# Patient Record
Sex: FEMALE | Race: WHITE
[De-identification: names, ages, dates, MRNs, and addresses within clinical notes are randomized per-mention and may not be internally consistent; named-entity substitution may affect disease eponyms.]

---

## 2017-05-18 ENCOUNTER — APPOINTMENT (OUTPATIENT)
Dept: OPHTHALMOLOGY | Facility: CLINIC | Age: 62
End: 2017-05-18

## 2017-05-18 DIAGNOSIS — H26.9 UNSPECIFIED CATARACT: ICD-10-CM

## 2019-06-03 ENCOUNTER — TRANSCRIPTION ENCOUNTER (OUTPATIENT)
Age: 64
End: 2019-06-03

## 2023-03-24 ENCOUNTER — APPOINTMENT (OUTPATIENT)
Dept: INTERNAL MEDICINE | Facility: CLINIC | Age: 68
End: 2023-03-24
Payer: MEDICARE

## 2023-03-24 VITALS
HEIGHT: 64 IN | DIASTOLIC BLOOD PRESSURE: 74 MMHG | BODY MASS INDEX: 26.8 KG/M2 | HEART RATE: 60 BPM | OXYGEN SATURATION: 96 % | WEIGHT: 157 LBS | TEMPERATURE: 99 F | SYSTOLIC BLOOD PRESSURE: 116 MMHG

## 2023-03-24 DIAGNOSIS — I25.10 ATHEROSCLEROTIC HEART DISEASE OF NATIVE CORONARY ARTERY W/OUT ANGINA PECTORIS: ICD-10-CM

## 2023-03-24 DIAGNOSIS — E78.5 HYPERLIPIDEMIA, UNSPECIFIED: ICD-10-CM

## 2023-03-24 DIAGNOSIS — Z95.5 PRESENCE OF CORONARY ANGIOPLASTY IMPLANT AND GRAFT: ICD-10-CM

## 2023-03-24 DIAGNOSIS — D64.9 ANEMIA, UNSPECIFIED: ICD-10-CM

## 2023-03-24 DIAGNOSIS — Z80.0 FAMILY HISTORY OF MALIGNANT NEOPLASM OF DIGESTIVE ORGANS: ICD-10-CM

## 2023-03-24 PROCEDURE — 99214 OFFICE O/P EST MOD 30 MIN: CPT

## 2023-03-24 RX ORDER — IRON/IRON ASP GLY/FA/MV-MIN 38 125-25-1MG
TABLET ORAL
Refills: 0 | Status: ACTIVE | COMMUNITY

## 2023-03-24 RX ORDER — VITAMIN E ACID SUCCINATE 268 MG
TABLET ORAL
Refills: 0 | Status: ACTIVE | COMMUNITY

## 2023-03-24 RX ORDER — ROSUVASTATIN CALCIUM 40 MG/1
40 TABLET, FILM COATED ORAL DAILY
Refills: 0 | Status: ACTIVE | COMMUNITY

## 2023-03-24 RX ORDER — MULTIVITAMIN
TABLET ORAL DAILY
Refills: 0 | Status: ACTIVE | COMMUNITY

## 2023-03-24 RX ORDER — ASPIRIN 81 MG
81 TABLET, DELAYED RELEASE (ENTERIC COATED) ORAL DAILY
Refills: 0 | Status: ACTIVE | COMMUNITY

## 2023-03-24 RX ORDER — METOPROLOL SUCCINATE 25 MG/1
25 TABLET, EXTENDED RELEASE ORAL DAILY
Refills: 0 | Status: ACTIVE | COMMUNITY

## 2023-03-24 RX ORDER — VITAMIN B COMPLEX
TABLET ORAL
Refills: 0 | Status: ACTIVE | COMMUNITY

## 2023-03-24 NOTE — HEALTH RISK ASSESSMENT
[No] : In the past 12 months have you used drugs other than those required for medical reasons? No [0] : 2) Feeling down, depressed, or hopeless: Not at all (0) [Patient reported mammogram was normal] : Patient reported mammogram was normal [Patient reported PAP Smear was normal] : Patient reported PAP Smear was normal [Patient reported colonoscopy was normal] : Patient reported colonoscopy was normal [Never] : Never [Patient reported bone density results were normal] : Patient reported bone density results were normal [[id number]] : 0 [MammogramDate] : 12/2022 [MammogramComments] : JARED [PapSmearDate] : 12/2022 [PapSmearComments] : Dr.Camilla Dumont, Gyn [BoneDensityDate] : 12/2022 [BoneDensityComments] : Dr.Camilla Dumont, Gyn [ColonoscopyDate] : 01/2021 [ColonoscopyComments] : [facility], repeat in 2025.

## 2023-03-24 NOTE — HISTORY OF PRESENT ILLNESS
[FreeTextEntry1] : Patient comes in to establish care.\par  [de-identified] : BEN DOHERTY is a 67 year F who comes in to establish care.\par Patient states she was following with [facility]  and saw the NP last year for her annual exam.\par She notes a history of CAD status post stent 4 years ago and follows with cardiology , hyperlipoidemia and anemia.\par She has not had blood work done since last year and would like to have her blood work taken today.\par Patient denies any cp, sob,abdominal pain, nausea, vomiting, palpitations, fever, chills, constipation, diarrhea.\par

## 2023-03-24 NOTE — ASSESSMENT
[FreeTextEntry1] : 1.health maintenance: Continue follow-up with [facility], [place] for mammograms, GYN for Pap smear and bone densities.  She will follow-up with GI for repeat colonoscopy in 2025.\par Discussed blood work to obtain.\par Patient counseled regarding recommendations for vaccines, diet and exercise and all preventative screening.\par \par 2.CAD status post stent: Obtain records from cardiology and continue follow-up.  Continue on statin and aspirin.\par \par 3.history of anemia: She continues on ferrous sulfate, notes it is due to heavy menses but now post menopause, recheck CBC.\par \par 4.hyperlipidemia: Continue on Crestor 40 mg and recheck lipids. Patient advised on low cholesterol diet-decrease in white carbs and exercise 150 minutes per week.\par

## 2023-03-27 LAB
ALBUMIN SERPL ELPH-MCNC: 4.2 G/DL
ALP BLD-CCNC: 81 U/L
ALT SERPL-CCNC: 23 U/L
ANION GAP SERPL CALC-SCNC: 11 MMOL/L
AST SERPL-CCNC: 20 U/L
BASOPHILS # BLD AUTO: 0.04 K/UL
BASOPHILS NFR BLD AUTO: 0.8 %
BILIRUB SERPL-MCNC: 0.4 MG/DL
BUN SERPL-MCNC: 18 MG/DL
CALCIUM SERPL-MCNC: 9.6 MG/DL
CHLORIDE SERPL-SCNC: 107 MMOL/L
CHOLEST SERPL-MCNC: 164 MG/DL
CO2 SERPL-SCNC: 26 MMOL/L
CREAT SERPL-MCNC: 0.73 MG/DL
EGFR: 91 ML/MIN/1.73M2
EOSINOPHIL # BLD AUTO: 0.04 K/UL
EOSINOPHIL NFR BLD AUTO: 0.8 %
ESTIMATED AVERAGE GLUCOSE: 120 MG/DL
GLUCOSE SERPL-MCNC: 103 MG/DL
HBA1C MFR BLD HPLC: 5.8 %
HCT VFR BLD CALC: 41.8 %
HDLC SERPL-MCNC: 80 MG/DL
HGB BLD-MCNC: 13.1 G/DL
IMM GRANULOCYTES NFR BLD AUTO: 0.2 %
LDLC SERPL CALC-MCNC: 73 MG/DL
LYMPHOCYTES # BLD AUTO: 1.28 K/UL
LYMPHOCYTES NFR BLD AUTO: 26.3 %
MAN DIFF?: NORMAL
MCHC RBC-ENTMCNC: 29.6 PG
MCHC RBC-ENTMCNC: 31.3 GM/DL
MCV RBC AUTO: 94.4 FL
MONOCYTES # BLD AUTO: 0.33 K/UL
MONOCYTES NFR BLD AUTO: 6.8 %
NEUTROPHILS # BLD AUTO: 3.16 K/UL
NEUTROPHILS NFR BLD AUTO: 65.1 %
NONHDLC SERPL-MCNC: 84 MG/DL
PLATELET # BLD AUTO: 240 K/UL
POTASSIUM SERPL-SCNC: 4.8 MMOL/L
PROT SERPL-MCNC: 6.7 G/DL
RBC # BLD: 4.43 M/UL
RBC # FLD: 13.5 %
SODIUM SERPL-SCNC: 143 MMOL/L
TRIGL SERPL-MCNC: 56 MG/DL
TSH SERPL-ACNC: 1.37 UIU/ML
WBC # FLD AUTO: 4.86 K/UL

## 2023-03-29 ENCOUNTER — NON-APPOINTMENT (OUTPATIENT)
Age: 68
End: 2023-03-29

## 2023-03-29 DIAGNOSIS — R73.03 PREDIABETES.: ICD-10-CM

## 2023-07-28 ENCOUNTER — APPOINTMENT (OUTPATIENT)
Dept: INTERNAL MEDICINE | Facility: CLINIC | Age: 68
End: 2023-07-28

## 2023-08-26 ENCOUNTER — INPATIENT (INPATIENT)
Facility: HOSPITAL | Age: 68
LOS: 5 days | Discharge: HOME | DRG: 867 | End: 2023-09-01
Attending: HOSPITALIST | Admitting: STUDENT IN AN ORGANIZED HEALTH CARE EDUCATION/TRAINING PROGRAM
Payer: MEDICARE

## 2023-08-26 VITALS
RESPIRATION RATE: 18 BRPM | HEART RATE: 78 BPM | OXYGEN SATURATION: 95 % | TEMPERATURE: 103 F | DIASTOLIC BLOOD PRESSURE: 58 MMHG | SYSTOLIC BLOOD PRESSURE: 140 MMHG

## 2023-08-26 DIAGNOSIS — R50.9 FEVER, UNSPECIFIED: ICD-10-CM

## 2023-08-26 LAB
ADD ON TEST-SPECIMEN IN LAB: SIGNIFICANT CHANGE UP
ALBUMIN SERPL ELPH-MCNC: 2.9 G/DL — LOW (ref 3.3–5)
ALP SERPL-CCNC: 72 U/L — SIGNIFICANT CHANGE UP (ref 40–120)
ALT FLD-CCNC: 43 U/L — SIGNIFICANT CHANGE UP (ref 12–78)
ANION GAP SERPL CALC-SCNC: 7 MMOL/L — SIGNIFICANT CHANGE UP (ref 5–17)
APPEARANCE UR: CLEAR — SIGNIFICANT CHANGE UP
APTT BLD: 31.1 SEC — SIGNIFICANT CHANGE UP (ref 24.5–35.6)
AST SERPL-CCNC: 41 U/L — HIGH (ref 15–37)
BACTERIA # UR AUTO: NEGATIVE — SIGNIFICANT CHANGE UP
BASOPHILS # BLD AUTO: 0.02 K/UL — SIGNIFICANT CHANGE UP (ref 0–0.2)
BASOPHILS NFR BLD AUTO: 0.5 % — SIGNIFICANT CHANGE UP (ref 0–2)
BILIRUB SERPL-MCNC: 1.5 MG/DL — HIGH (ref 0.2–1.2)
BILIRUB UR-MCNC: NEGATIVE — SIGNIFICANT CHANGE UP
BUN SERPL-MCNC: 14 MG/DL — SIGNIFICANT CHANGE UP (ref 7–23)
CALCIUM SERPL-MCNC: 8.3 MG/DL — LOW (ref 8.5–10.1)
CHLORIDE SERPL-SCNC: 100 MMOL/L — SIGNIFICANT CHANGE UP (ref 96–108)
CO2 SERPL-SCNC: 23 MMOL/L — SIGNIFICANT CHANGE UP (ref 22–31)
COLOR SPEC: YELLOW — SIGNIFICANT CHANGE UP
CREAT SERPL-MCNC: 0.82 MG/DL — SIGNIFICANT CHANGE UP (ref 0.5–1.3)
DIFF PNL FLD: ABNORMAL
EGFR: 79 ML/MIN/1.73M2 — SIGNIFICANT CHANGE UP
EOSINOPHIL # BLD AUTO: 0.02 K/UL — SIGNIFICANT CHANGE UP (ref 0–0.5)
EOSINOPHIL NFR BLD AUTO: 0.5 % — SIGNIFICANT CHANGE UP (ref 0–6)
EPI CELLS # UR: NEGATIVE — SIGNIFICANT CHANGE UP
GLUCOSE SERPL-MCNC: 119 MG/DL — HIGH (ref 70–99)
GLUCOSE UR QL: NEGATIVE — SIGNIFICANT CHANGE UP
HCT VFR BLD CALC: 32.7 % — LOW (ref 34.5–45)
HGB BLD-MCNC: 11.1 G/DL — LOW (ref 11.5–15.5)
IMM GRANULOCYTES NFR BLD AUTO: 0.5 % — SIGNIFICANT CHANGE UP (ref 0–0.9)
INR BLD: 1.19 RATIO — HIGH (ref 0.85–1.18)
KETONES UR-MCNC: NEGATIVE — SIGNIFICANT CHANGE UP
LACTATE SERPL-SCNC: 0.6 MMOL/L — LOW (ref 0.7–2)
LEUKOCYTE ESTERASE UR-ACNC: NEGATIVE — SIGNIFICANT CHANGE UP
LG PLATELETS BLD QL AUTO: SLIGHT — SIGNIFICANT CHANGE UP
LYMPHOCYTES # BLD AUTO: 1.05 K/UL — SIGNIFICANT CHANGE UP (ref 1–3.3)
LYMPHOCYTES # BLD AUTO: 25.4 % — SIGNIFICANT CHANGE UP (ref 13–44)
MANUAL SMEAR VERIFICATION: SIGNIFICANT CHANGE UP
MCHC RBC-ENTMCNC: 30 PG — SIGNIFICANT CHANGE UP (ref 27–34)
MCHC RBC-ENTMCNC: 33.9 GM/DL — SIGNIFICANT CHANGE UP (ref 32–36)
MCV RBC AUTO: 88.4 FL — SIGNIFICANT CHANGE UP (ref 80–100)
MONOCYTES # BLD AUTO: 0.53 K/UL — SIGNIFICANT CHANGE UP (ref 0–0.9)
MONOCYTES NFR BLD AUTO: 12.8 % — SIGNIFICANT CHANGE UP (ref 2–14)
NEUTROPHILS # BLD AUTO: 2.5 K/UL — SIGNIFICANT CHANGE UP (ref 1.8–7.4)
NEUTROPHILS NFR BLD AUTO: 60.3 % — SIGNIFICANT CHANGE UP (ref 43–77)
NITRITE UR-MCNC: NEGATIVE — SIGNIFICANT CHANGE UP
NT-PROBNP SERPL-SCNC: 752 PG/ML — HIGH (ref 0–125)
PARASITE BLD: SIGNIFICANT CHANGE UP
PH UR: 6.5 — SIGNIFICANT CHANGE UP (ref 5–8)
PLAT MORPH BLD: ABNORMAL
PLATELET # BLD AUTO: 59 K/UL — LOW (ref 150–400)
POTASSIUM SERPL-MCNC: 4.1 MMOL/L — SIGNIFICANT CHANGE UP (ref 3.5–5.3)
POTASSIUM SERPL-SCNC: 4.1 MMOL/L — SIGNIFICANT CHANGE UP (ref 3.5–5.3)
PROT SERPL-MCNC: 7 GM/DL — SIGNIFICANT CHANGE UP (ref 6–8.3)
PROT UR-MCNC: NEGATIVE — SIGNIFICANT CHANGE UP
PROTHROM AB SERPL-ACNC: 13.4 SEC — HIGH (ref 9.5–13)
RAPID RVP RESULT: SIGNIFICANT CHANGE UP
RBC # BLD: 3.7 M/UL — LOW (ref 3.8–5.2)
RBC # FLD: 14.7 % — HIGH (ref 10.3–14.5)
RBC BLD AUTO: SIGNIFICANT CHANGE UP
RBC CASTS # UR COMP ASSIST: SIGNIFICANT CHANGE UP /HPF (ref 0–4)
SARS-COV-2 RNA SPEC QL NAA+PROBE: SIGNIFICANT CHANGE UP
SODIUM SERPL-SCNC: 130 MMOL/L — LOW (ref 135–145)
SP GR SPEC: 1.01 — SIGNIFICANT CHANGE UP (ref 1.01–1.02)
TROPONIN I, HIGH SENSITIVITY RESULT: 6.73 NG/L — SIGNIFICANT CHANGE UP
UROBILINOGEN FLD QL: NEGATIVE — SIGNIFICANT CHANGE UP
WBC # BLD: 4.14 K/UL — SIGNIFICANT CHANGE UP (ref 3.8–10.5)
WBC # FLD AUTO: 4.14 K/UL — SIGNIFICANT CHANGE UP (ref 3.8–10.5)
WBC UR QL: NEGATIVE /HPF — SIGNIFICANT CHANGE UP (ref 0–5)

## 2023-08-26 PROCEDURE — 85027 COMPLETE CBC AUTOMATED: CPT

## 2023-08-26 PROCEDURE — 93010 ELECTROCARDIOGRAM REPORT: CPT

## 2023-08-26 PROCEDURE — 93306 TTE W/DOPPLER COMPLETE: CPT

## 2023-08-26 PROCEDURE — 83550 IRON BINDING TEST: CPT

## 2023-08-26 PROCEDURE — 85045 AUTOMATED RETICULOCYTE COUNT: CPT

## 2023-08-26 PROCEDURE — 86618 LYME DISEASE ANTIBODY: CPT

## 2023-08-26 PROCEDURE — 71250 CT THORAX DX C-: CPT | Mod: MA

## 2023-08-26 PROCEDURE — 71045 X-RAY EXAM CHEST 1 VIEW: CPT | Mod: 26

## 2023-08-26 PROCEDURE — 97116 GAIT TRAINING THERAPY: CPT | Mod: GP

## 2023-08-26 PROCEDURE — 82607 VITAMIN B-12: CPT

## 2023-08-26 PROCEDURE — 87468 ANAPLSMA PHGCYTOPHLM AMP PRB: CPT

## 2023-08-26 PROCEDURE — 99285 EMERGENCY DEPT VISIT HI MDM: CPT

## 2023-08-26 PROCEDURE — 80053 COMPREHEN METABOLIC PANEL: CPT

## 2023-08-26 PROCEDURE — 83010 ASSAY OF HAPTOGLOBIN QUANT: CPT

## 2023-08-26 PROCEDURE — 82746 ASSAY OF FOLIC ACID SERUM: CPT

## 2023-08-26 PROCEDURE — 97161 PT EVAL LOW COMPLEX 20 MIN: CPT | Mod: GP

## 2023-08-26 PROCEDURE — 83615 LACTATE (LD) (LDH) ENZYME: CPT

## 2023-08-26 PROCEDURE — 83540 ASSAY OF IRON: CPT

## 2023-08-26 PROCEDURE — 85049 AUTOMATED PLATELET COUNT: CPT

## 2023-08-26 PROCEDURE — 87207 SMEAR SPECIAL STAIN: CPT

## 2023-08-26 PROCEDURE — 86617 LYME DISEASE ANTIBODY: CPT

## 2023-08-26 PROCEDURE — 71250 CT THORAX DX C-: CPT | Mod: 26

## 2023-08-26 PROCEDURE — 80048 BASIC METABOLIC PNL TOTAL CA: CPT

## 2023-08-26 PROCEDURE — 82728 ASSAY OF FERRITIN: CPT

## 2023-08-26 PROCEDURE — 87484 EHRLICHA CHAFFEENSIS AMP PRB: CPT

## 2023-08-26 PROCEDURE — 86803 HEPATITIS C AB TEST: CPT

## 2023-08-26 PROCEDURE — 87798 DETECT AGENT NOS DNA AMP: CPT

## 2023-08-26 PROCEDURE — 36415 COLL VENOUS BLD VENIPUNCTURE: CPT

## 2023-08-26 RX ORDER — ACETAMINOPHEN 500 MG
650 TABLET ORAL EVERY 6 HOURS
Refills: 0 | Status: DISCONTINUED | OUTPATIENT
Start: 2023-08-26 | End: 2023-09-01

## 2023-08-26 RX ORDER — ATOVAQUONE 750 MG/5ML
750 SUSPENSION ORAL ONCE
Refills: 0 | Status: COMPLETED | OUTPATIENT
Start: 2023-08-26 | End: 2023-08-26

## 2023-08-26 RX ORDER — AZITHROMYCIN 500 MG/1
500 TABLET, FILM COATED ORAL ONCE
Refills: 0 | Status: COMPLETED | OUTPATIENT
Start: 2023-08-26 | End: 2023-08-26

## 2023-08-26 RX ORDER — ACETAMINOPHEN 500 MG
650 TABLET ORAL ONCE
Refills: 0 | Status: COMPLETED | OUTPATIENT
Start: 2023-08-26 | End: 2023-08-26

## 2023-08-26 RX ORDER — SODIUM CHLORIDE 9 MG/ML
2000 INJECTION INTRAMUSCULAR; INTRAVENOUS; SUBCUTANEOUS ONCE
Refills: 0 | Status: COMPLETED | OUTPATIENT
Start: 2023-08-26 | End: 2023-08-26

## 2023-08-26 RX ADMIN — SODIUM CHLORIDE 2000 MILLILITER(S): 9 INJECTION INTRAMUSCULAR; INTRAVENOUS; SUBCUTANEOUS at 19:50

## 2023-08-26 RX ADMIN — SODIUM CHLORIDE 2000 MILLILITER(S): 9 INJECTION INTRAMUSCULAR; INTRAVENOUS; SUBCUTANEOUS at 22:04

## 2023-08-26 RX ADMIN — AZITHROMYCIN 255 MILLIGRAM(S): 500 TABLET, FILM COATED ORAL at 22:39

## 2023-08-26 RX ADMIN — ATOVAQUONE 750 MILLIGRAM(S): 750 SUSPENSION ORAL at 22:38

## 2023-08-26 NOTE — ED ADULT NURSE NOTE - NSFALLUNIVINTERV_ED_ALL_ED
Bed/Stretcher in lowest position, wheels locked, appropriate side rails in place/Call bell, personal items and telephone in reach/Instruct patient to call for assistance before getting out of bed/chair/stretcher/Non-slip footwear applied when patient is off stretcher/[place] to call system/Physically safe environment - no spills, clutter or unnecessary equipment/Purposeful proactive rounding/Room/bathroom lighting operational, light cord in reach

## 2023-08-26 NOTE — ED PROVIDER NOTE - PROGRESS NOTE DETAILS
Walter Durand for Dr. Silverman: Patient positive for babesia. Will start treatment and admit to medicine. Will also add on tickborne panel to look for co-infection. Walter Durand for Dr. Silverman: Patient highly suspected to have babesia as per cbc. . Will start treatment and admit to medicine. Will also add on tickborne panel to look for co-infection.

## 2023-08-26 NOTE — ED PROVIDER NOTE - ENMT, MLM
Airway patent, Nasal mucosa clear. Dry oral mucus membranes. Throat has no vesicles, no oropharyngeal exudates and uvula is midline.

## 2023-08-26 NOTE — ED PROVIDER NOTE - OBJECTIVE STATEMENT
68 y/o female with PMHx of CAD s/p stents presents to the ED c/o fever (Tmax 105), weakness, nausea, and episode of syncope today while standing from bed. Patient states she stood, lost consciousness, then fell backward onto the bed. Last took 500mg Tylenol PTA. Denies head strike. Denies vomiting, diarrhea, rhinorrhea, sore throat, urinary symptoms, chest pain. Nonsmoker, nondrinker.

## 2023-08-26 NOTE — ED ADULT TRIAGE NOTE - CHIEF COMPLAINT QUOTE
pt presents to ed from home for evaluation of fevers (tmax 105) since yesterday with associated nausea, vomiting and poor po intake, pt seen at urgent care with  negative work up. pt denies chest pain or sob. pt has hx of stents

## 2023-08-26 NOTE — ED PROVIDER NOTE - CLINICAL SUMMARY MEDICAL DECISION MAKING FREE TEXT BOX
Adult female coming in with fever, on day 2, syncopal episode with a fall but onto her bed, feeling generally weak. Vitals here febrile but otherwise normal. Exam no signs of bacterail infection, no signs of meningitis. CXR pending, 12 leads pending, cardiac enzymes pending, viral panel pending, UA pending. Tylenol given. If all results negative, will discharge.

## 2023-08-26 NOTE — ED ADULT NURSE NOTE - OBJECTIVE STATEMENT
pt presents to the ED c/o fever for multiple days with a tmax of 105. pt reports using around the clock tylenol and motrin to treat the fever - reportedly took it right before coming to . pt reports associated chills and nausea. denies any sick contacts. denies SOB or chest pain. pt is A&Ox4 and ambulatory at baseline. no further complaints at this time.

## 2023-08-27 DIAGNOSIS — R50.9 FEVER, UNSPECIFIED: ICD-10-CM

## 2023-08-27 DIAGNOSIS — R55 SYNCOPE AND COLLAPSE: ICD-10-CM

## 2023-08-27 DIAGNOSIS — E87.1 HYPO-OSMOLALITY AND HYPONATREMIA: ICD-10-CM

## 2023-08-27 LAB
ANION GAP SERPL CALC-SCNC: 2 MMOL/L — LOW (ref 5–17)
BABESIA MICROTI PCR, BLD RESULT: DETECTED
BABESIA MICROTI PCR, BLD RESULT: DETECTED
BUN SERPL-MCNC: 8 MG/DL — SIGNIFICANT CHANGE UP (ref 7–23)
CALCIUM SERPL-MCNC: 8.6 MG/DL — SIGNIFICANT CHANGE UP (ref 8.5–10.1)
CHLORIDE SERPL-SCNC: 110 MMOL/L — HIGH (ref 96–108)
CO2 SERPL-SCNC: 27 MMOL/L — SIGNIFICANT CHANGE UP (ref 22–31)
CREAT SERPL-MCNC: 0.72 MG/DL — SIGNIFICANT CHANGE UP (ref 0.5–1.3)
CULTURE RESULTS: SIGNIFICANT CHANGE UP
EGFR: 92 ML/MIN/1.73M2 — SIGNIFICANT CHANGE UP
FERRITIN SERPL-MCNC: 721 NG/ML — HIGH (ref 13–330)
FOLATE SERPL-MCNC: >20 NG/ML — SIGNIFICANT CHANGE UP
GLUCOSE SERPL-MCNC: 127 MG/DL — HIGH (ref 70–99)
HAPTOGLOB SERPL-MCNC: <20 MG/DL — LOW (ref 34–200)
HCT VFR BLD CALC: 31 % — LOW (ref 34.5–45)
HCV AB S/CO SERPL IA: 0.2 S/CO — SIGNIFICANT CHANGE UP (ref 0–0.99)
HCV AB SERPL-IMP: SIGNIFICANT CHANGE UP
HGB BLD-MCNC: 10.6 G/DL — LOW (ref 11.5–15.5)
IRON SATN MFR SERPL: 10 % — LOW (ref 14–50)
IRON SATN MFR SERPL: 21 UG/DL — LOW (ref 30–160)
LDH SERPL L TO P-CCNC: 480 U/L — HIGH (ref 84–241)
MCHC RBC-ENTMCNC: 30.3 PG — SIGNIFICANT CHANGE UP (ref 27–34)
MCHC RBC-ENTMCNC: 34.2 GM/DL — SIGNIFICANT CHANGE UP (ref 32–36)
MCV RBC AUTO: 88.6 FL — SIGNIFICANT CHANGE UP (ref 80–100)
PLATELET # BLD AUTO: 47 K/UL — LOW (ref 150–400)
POTASSIUM SERPL-MCNC: 4.3 MMOL/L — SIGNIFICANT CHANGE UP (ref 3.5–5.3)
POTASSIUM SERPL-SCNC: 4.3 MMOL/L — SIGNIFICANT CHANGE UP (ref 3.5–5.3)
RBC # BLD: 3.5 M/UL — LOW (ref 3.8–5.2)
RBC # BLD: 3.5 M/UL — LOW (ref 3.8–5.2)
RBC # FLD: 15 % — HIGH (ref 10.3–14.5)
RETICS #: 101.9 K/UL — SIGNIFICANT CHANGE UP (ref 25–125)
RETICS/RBC NFR: 2.9 % — HIGH (ref 0.5–2.5)
SODIUM SERPL-SCNC: 139 MMOL/L — SIGNIFICANT CHANGE UP (ref 135–145)
SPECIMEN SOURCE: SIGNIFICANT CHANGE UP
TIBC SERPL-MCNC: 207 UG/DL — LOW (ref 220–430)
UIBC SERPL-MCNC: 186 UG/DL — SIGNIFICANT CHANGE UP (ref 110–370)
VIT B12 SERPL-MCNC: 708 PG/ML — SIGNIFICANT CHANGE UP (ref 232–1245)
WBC # BLD: 3.88 K/UL — SIGNIFICANT CHANGE UP (ref 3.8–10.5)
WBC # FLD AUTO: 3.88 K/UL — SIGNIFICANT CHANGE UP (ref 3.8–10.5)

## 2023-08-27 PROCEDURE — 99233 SBSQ HOSP IP/OBS HIGH 50: CPT

## 2023-08-27 RX ORDER — METOPROLOL TARTRATE 50 MG
1 TABLET ORAL
Refills: 0 | DISCHARGE

## 2023-08-27 RX ORDER — ASPIRIN/CALCIUM CARB/MAGNESIUM 324 MG
81 TABLET ORAL DAILY
Refills: 0 | Status: DISCONTINUED | OUTPATIENT
Start: 2023-08-27 | End: 2023-09-01

## 2023-08-27 RX ORDER — SODIUM CHLORIDE 9 MG/ML
500 INJECTION INTRAMUSCULAR; INTRAVENOUS; SUBCUTANEOUS ONCE
Refills: 0 | Status: COMPLETED | OUTPATIENT
Start: 2023-08-27 | End: 2023-08-27

## 2023-08-27 RX ORDER — METOPROLOL TARTRATE 50 MG
25 TABLET ORAL EVERY 12 HOURS
Refills: 0 | Status: DISCONTINUED | OUTPATIENT
Start: 2023-08-27 | End: 2023-09-01

## 2023-08-27 RX ORDER — AZITHROMYCIN 500 MG/1
500 TABLET, FILM COATED ORAL DAILY
Refills: 0 | Status: DISCONTINUED | OUTPATIENT
Start: 2023-08-27 | End: 2023-08-27

## 2023-08-27 RX ORDER — ASPIRIN/CALCIUM CARB/MAGNESIUM 324 MG
1 TABLET ORAL
Refills: 0 | DISCHARGE

## 2023-08-27 RX ORDER — ROSUVASTATIN CALCIUM 5 MG/1
1 TABLET ORAL
Refills: 0 | DISCHARGE

## 2023-08-27 RX ORDER — ATOVAQUONE 750 MG/5ML
750 SUSPENSION ORAL EVERY 12 HOURS
Refills: 0 | Status: DISCONTINUED | OUTPATIENT
Start: 2023-08-27 | End: 2023-09-01

## 2023-08-27 RX ORDER — AZITHROMYCIN 500 MG/1
500 TABLET, FILM COATED ORAL EVERY 24 HOURS
Refills: 0 | Status: DISCONTINUED | OUTPATIENT
Start: 2023-08-27 | End: 2023-08-31

## 2023-08-27 RX ORDER — ATORVASTATIN CALCIUM 80 MG/1
40 TABLET, FILM COATED ORAL AT BEDTIME
Refills: 0 | Status: DISCONTINUED | OUTPATIENT
Start: 2023-08-27 | End: 2023-09-01

## 2023-08-27 RX ADMIN — ATOVAQUONE 750 MILLIGRAM(S): 750 SUSPENSION ORAL at 10:58

## 2023-08-27 RX ADMIN — Medication 650 MILLIGRAM(S): at 07:37

## 2023-08-27 RX ADMIN — Medication 650 MILLIGRAM(S): at 05:02

## 2023-08-27 RX ADMIN — ATORVASTATIN CALCIUM 40 MILLIGRAM(S): 80 TABLET, FILM COATED ORAL at 21:42

## 2023-08-27 RX ADMIN — Medication 650 MILLIGRAM(S): at 01:37

## 2023-08-27 RX ADMIN — SODIUM CHLORIDE 500 MILLILITER(S): 9 INJECTION INTRAMUSCULAR; INTRAVENOUS; SUBCUTANEOUS at 17:31

## 2023-08-27 RX ADMIN — ATOVAQUONE 750 MILLIGRAM(S): 750 SUSPENSION ORAL at 21:42

## 2023-08-27 RX ADMIN — AZITHROMYCIN 255 MILLIGRAM(S): 500 TABLET, FILM COATED ORAL at 10:58

## 2023-08-27 RX ADMIN — Medication 81 MILLIGRAM(S): at 11:02

## 2023-08-27 RX ADMIN — Medication 650 MILLIGRAM(S): at 14:44

## 2023-08-27 RX ADMIN — Medication 25 MILLIGRAM(S): at 06:28

## 2023-08-27 RX ADMIN — Medication 25 MILLIGRAM(S): at 17:30

## 2023-08-27 NOTE — H&P ADULT - PROBLEM SELECTOR PLAN 1
# Febrile syndrome   - As per ED course: highly suspicious for babesia  IV Zithromax /  Mepron  - panculture  - rapid RVP - not detected  - ID following

## 2023-08-27 NOTE — H&P ADULT - HISTORY OF PRESENT ILLNESS
Patient is a 66 y/o/f with PMHx of CAD (s/p stents) admitted in the hospital with high fevers as high as 105. Associated symptoms of nausea, not feeling well and weakness. Patient also notes that   she was standing and lost consciousness, then fell backward onto the bed.  Denies head strike. Denies vomiting, diarrhea, rhinorrhea, sore throat, urinary symptoms, chest pain. Nonsmoker, nondrinker. Patient is a 66 y/o/f with PMHx of CAD (s/p stents) admitted in the hospital with high fevers as high as 105. Associated symptoms of nausea, not feeling well and weakness. Patient also notes that   she was standing and lost consciousness, then fell backward onto the bed.  Denies head strike. Denies coughing, vomiting, diarrhea, rhinorrhea, sore throat, urinary symptoms, chest pain. Nonsmoker, nondrinker. Patient notes of being bitten by the deer tic in july.

## 2023-08-27 NOTE — H&P ADULT - PROBLEM SELECTOR PLAN 3
- this could be secondary to dehydration  - monitor patient on telemetry  - maintain IV hydration /  ED couse -  2 L of fluids.

## 2023-08-27 NOTE — H&P ADULT - NSHPREVIEWOFSYSTEMS_GEN_ALL_CORE
REVIEW OF SYSTEMS:  General: NAD, hemodynamically stable, (-)  fever, (-) chills, (-) weakness  HEENT:  Eyes:  No visual loss, blurred vision, double vision or yellow sclerae. Ears, Nose, Throat:  No hearing loss, sneezing, congestion, runny nose or sore throat.  SKIN:  No rash or itching.  CARDIOVASCULAR:  No chest pain, chest pressure or chest discomfort. No palpitations or edema.  RESPIRATORY:  No shortness of breath, cough or sputum.  GASTROINTESTINAL:  No anorexia, nausea, vomiting or diarrhea. No abdominal pain or blood.  NEUROLOGICAL:  No headache, dizziness, syncope, paralysis, ataxia, numbness or tingling in the extremities. No change in bowel or bladder control.  MUSCULOSKELETAL:  No muscle, back pain, joint pain or stiffness.  HEMATOLOGIC:  No anemia, bleeding or bruising.  LYMPHATICS:  No enlarged nodes. No history of splenectomy.  ENDOCRINOLOGIC:  No reports of sweating, cold or heat intolerance. No polyuria or polydipsia.  ALLERGIES:  No history of asthma, hives, eczema or rhinitis.

## 2023-08-27 NOTE — CONSULT NOTE ADULT - SUBJECTIVE AND OBJECTIVE BOX
Patient is a 67y old  Female who presents with a chief complaint of Fevers / (27 Aug 2023 01:55)    HPI:  68 y/o/f with PMHx of CAD (s/p stents) admitted in the hospital with high fevers as high as 105. Associated symptoms of nausea, not feeling well and weakness. Patient also notes that  she was standing and lost consciousness, then fell backward onto the bed.  Denies head strike. Denies coughing, vomiting, diarrhea, rhinorrhea, sore throat, urinary symptoms, chest pain. Nonsmoker, nondrinker. Patient notes of being bitten by the deer tick in july. Here noted with babesia 2% parasitemia. Started on mepron/azithromycin.       PMH: as above  PSH: as above    Meds: per reconciliation sheet, noted below  MEDICATIONS  (STANDING):  aspirin  chewable 81 milliGRAM(s) Oral daily  atorvastatin 40 milliGRAM(s) Oral at bedtime  atovaquone  Suspension 750 milliGRAM(s) Oral every 12 hours  azithromycin  IVPB 500 milliGRAM(s) IV Intermittent every 24 hours  metoprolol tartrate 25 milliGRAM(s) Oral every 12 hours    Allergies    Allergy Status Unknown    Intolerances      Social: no smoking, no alcohol, no illegal drugs; no recent travel, no exposure to TB  FAMILY HISTORY:     no history of premature cardiovascular disease in first degree relatives    ROS: + fever, + chills, no HA, no dizziness, no sore throat, no blurry vision, no CP, no palpitations, no SOB, no cough, no abdominal pain, no diarrhea, no N/V, no dysuria, no leg pain, no claudication, no rash, no joint aches, no rectal pain or bleeding, no night sweats    All other systems reviewed and are negative    Vital Signs Last 24 Hrs  T(C): 37.9 (27 Aug 2023 08:15), Max: 39.6 (26 Aug 2023 18:41)  T(F): 100.3 (27 Aug 2023 08:15), Max: 103.2 (26 Aug 2023 18:41)  HR: 66 (27 Aug 2023 08:15) (66 - 78)  BP: 139/43 (27 Aug 2023 08:15) (111/52 - 141/50)  BP(mean): 69 (27 Aug 2023 04:59) (69 - 69)  RR: 19 (27 Aug 2023 08:15) (14 - 19)  SpO2: 93% (27 Aug 2023 08:15) (93% - 97%)    Parameters below as of 27 Aug 2023 08:15  Patient On (Oxygen Delivery Method): room air      Daily     Daily Weight in k.7 (27 Aug 2023 06:25)    PE:  Constitutional: NAD  HEENT: NC/AT, EOMI, PERRLA, conjunctivae clear; ears and nose atraumatic; pharynx benign  Neck: supple; thyroid not palpable  Back: no tenderness  Respiratory: respiratory effort normal; clear to auscultation  Cardiovascular: S1S2 regular, no murmurs  Abdomen: soft, not tender, not distended, positive BS; liver and spleen WNL  Genitourinary: no suprapubic tenderness  Lymphatic: no LN palpable  Musculoskeletal: no muscle tenderness, no joint swelling or tenderness  Extremities: no pedal edema  Neurological/ Psychiatric: AxOx3, Judgement and insight normal;  moving all extremities  Skin: no rashes; no palpable lesions    Labs: all available labs reviewed                        10.6   3.88  )-----------( 47       ( 27 Aug 2023 05:49 )             31.0     08-27    139  |  110<H>  |  8   ----------------------------<  127<H>  4.3   |  27  |  0.72    Ca    8.6      27 Aug 2023 05:49    TPro  7.0  /  Alb  2.9<L>  /  TBili  1.5<H>  /  DBili  x   /  AST  41<H>  /  ALT  43  /  AlkPhos  72  08-26     LIVER FUNCTIONS - ( 26 Aug 2023 18:46 )  Alb: 2.9 g/dL / Pro: 7.0 gm/dL / ALK PHOS: 72 U/L / ALT: 43 U/L / AST: 41 U/L / GGT: x           Urinalysis Basic - ( 27 Aug 2023 05:49 )    Color: x / Appearance: x / SG: x / pH: x  Gluc: 127 mg/dL / Ketone: x  / Bili: x / Urobili: x   Blood: x / Protein: x / Nitrite: x   Leuk Esterase: x / RBC: x / WBC x   Sq Epi: x / Non Sq Epi: x / Bacteria: x        Culture Results:   Positive for Babesia species  by Giemsa Stain  Parasitemia = 2.0 %  ************************************************************  NEGATIVE for Plasmodium antigens. Microscopy is performed for  confirmation.  The Malaria Rapid antigen test does not detect the  presence of Babesia species. If Babesiosis is suspected  please order test Babesia PCR: Babesia microti PCR Bld  ************************************************************ ( @ 19:15)    Radiology: all available radiological tests reviewed  < from: CT Chest No Cont (23 @ 23:42) >  ACC: [id number] EXAM:  CT CHEST   ORDERED BY: SUMAN VAZQUEZ     PROCEDURE DATE:  2023          INTERPRETATION:  CLINICAL INFORMATION: Evaluate for pneumonia    COMPARISON: Same day radiograph    CONTRAST/COMPLICATIONS:  IV Contrast: NONE  Oral Contrast: NONE  Complications: None reported at time of study completion    PROCEDURE:  CT of the Chest was performed.  Sagittal and coronal reformats were performed.    FINDINGS:    LUNGS AND AIRWAYS: Patent central airways.  Bibasilar atelectasis.No   consolidation or airspace disease.  PLEURA: No pleural effusion.  MEDIASTINUM AND LAWSON: No lymphadenopathy.  VESSELS: Within normal limits.  HEART: Heart size is normal. No pericardial effusion.  CHEST WALL AND LOWER NECK: Within normal limits.  VISUALIZED UPPER ABDOMEN: Within normal limits.  BONES: Within normal limits.    IMPRESSION:  No evidence of pneumonia.      < end of copied text >    Advanced directives addressed: full resuscitation

## 2023-08-27 NOTE — H&P ADULT - NSHPLABSRESULTS_GEN_ALL_CORE
CBC Full  -  ( 26 Aug 2023 18:46 )  WBC Count : 4.14 K/uL  RBC Count : 3.70 M/uL  Hemoglobin : 11.1 g/dL  Hematocrit : 32.7 %  Platelet Count - Automated : 59 K/uL  Mean Cell Volume : 88.4 fl  Mean Cell Hemoglobin : 30.0 pg  Mean Cell Hemoglobin Concentration : 33.9 gm/dL  Auto Neutrophil # : 2.50 K/uL  Auto Lymphocyte # : 1.05 K/uL  Auto Monocyte # : 0.53 K/uL  Auto Eosinophil # : 0.02 K/uL  Auto Basophil # : 0.02 K/uL  Auto Neutrophil % : 60.3 %  Auto Lymphocyte % : 25.4 %  Auto Monocyte % : 12.8 %  Auto Eosinophil % : 0.5 %  Auto Basophil % : 0.5 %    PT/INR - ( 26 Aug 2023 18:46 )   PT: 13.4 sec;   INR: 1.19 ratio         PTT - ( 26 Aug 2023 18:46 )  PTT:31.1 sec  Urinalysis Basic - ( 26 Aug 2023 22:52 )    Color: Yellow / Appearance: Clear / S.010 / pH: x  Gluc: x / Ketone: Negative  / Bili: Negative / Urobili: Negative   Blood: x / Protein: Negative / Nitrite: Negative   Leuk Esterase: Negative / RBC: 0-2 /HPF / WBC Negative /HPF   Sq Epi: x / Non Sq Epi: x / Bacteria: Negative          130<L>  |  100  |  14  ----------------------------<  119<H>  4.1   |  23  |  0.82    Ca    8.3<L>      26 Aug 2023 18:46    TPro  7.0  /  Alb  2.9<L>  /  TBili  1.5<H>  /  DBili  x   /  AST  41<H>  /  ALT  43  /  AlkPhos  72

## 2023-08-27 NOTE — ED ADULT NURSE REASSESSMENT NOTE - NS ED NURSE REASSESS COMMENT FT1
Pt sitting up in bed, A&O. resp unlabored, no distress noted.
Pt A&O, resp unlabored.given water per request. explained to pt that she is awaiting a bed.

## 2023-08-27 NOTE — CONSULT NOTE ADULT - ASSESSMENT
68 y/o/f with PMHx of CAD (s/p stents) admitted in the hospital with high fevers as high as 105. Associated symptoms of nausea, not feeling well and weakness. Patient also notes that  she was standing and lost consciousness, then fell backward onto the bed.  Denies head strike. Denies coughing, vomiting, diarrhea, rhinorrhea, sore throat, urinary symptoms, chest pain. Nonsmoker, nondrinker. Patient notes of being bitten by the deer tick in july. Here noted with babesia 2% parasitemia. Started on mepron/azithromycin.     1. Fever. Babesiosis. Tick borne illness  - babesia noted 2 % parasitemia c/w mild-mod disease  - agree with mepron 750mg BID #1-2  - on azithromycin 500mg daily #2  - complete 10 day course  - f/u lyme screen, ehrlichia, anaplasma pcr r/o co infection (ordered)   - monitor temps  - tolerating abx well so far; no side effects noted  - reason for abx use and side effects reviewed with patient  - supportive care  - fu cbc    2. other issues - care per medicine

## 2023-08-27 NOTE — PATIENT PROFILE ADULT - FALL HARM RISK - HARM RISK INTERVENTIONS

## 2023-08-28 LAB
ALBUMIN SERPL ELPH-MCNC: 2.4 G/DL — LOW (ref 3.3–5)
ALP SERPL-CCNC: 64 U/L — SIGNIFICANT CHANGE UP (ref 40–120)
ALT FLD-CCNC: 56 U/L — SIGNIFICANT CHANGE UP (ref 12–78)
ANION GAP SERPL CALC-SCNC: 5 MMOL/L — SIGNIFICANT CHANGE UP (ref 5–17)
AST SERPL-CCNC: 47 U/L — HIGH (ref 15–37)
B BURGDOR C6 AB SER-ACNC: POSITIVE
B BURGDOR IGG+IGM SER QL IB: SIGNIFICANT CHANGE UP
B BURGDOR IGG+IGM SER-ACNC: 1.71 INDEX — HIGH (ref 0.01–0.89)
BILIRUB SERPL-MCNC: 0.9 MG/DL — SIGNIFICANT CHANGE UP (ref 0.2–1.2)
BUN SERPL-MCNC: 11 MG/DL — SIGNIFICANT CHANGE UP (ref 7–23)
CALCIUM SERPL-MCNC: 8.8 MG/DL — SIGNIFICANT CHANGE UP (ref 8.5–10.1)
CHLORIDE SERPL-SCNC: 107 MMOL/L — SIGNIFICANT CHANGE UP (ref 96–108)
CLOSURE TME COLL+EPINEP BLD: 45 K/UL — LOW (ref 150–400)
CO2 SERPL-SCNC: 25 MMOL/L — SIGNIFICANT CHANGE UP (ref 22–31)
CREAT SERPL-MCNC: 0.62 MG/DL — SIGNIFICANT CHANGE UP (ref 0.5–1.3)
CULTURE RESULTS: SIGNIFICANT CHANGE UP
CULTURE RESULTS: SIGNIFICANT CHANGE UP
EGFR: 98 ML/MIN/1.73M2 — SIGNIFICANT CHANGE UP
GLUCOSE SERPL-MCNC: 113 MG/DL — HIGH (ref 70–99)
HCT VFR BLD CALC: 31.3 % — LOW (ref 34.5–45)
HGB BLD-MCNC: 10.3 G/DL — LOW (ref 11.5–15.5)
LYME IGG AB: 0.05 INDEX — SIGNIFICANT CHANGE UP (ref 0.01–0.9)
LYME IGG INTERP: NEGATIVE — SIGNIFICANT CHANGE UP
LYME IGM AB: 0.26 INDEX — SIGNIFICANT CHANGE UP (ref 0.01–0.9)
LYME IGM INTERP: NEGATIVE — SIGNIFICANT CHANGE UP
MCHC RBC-ENTMCNC: 28.9 PG — SIGNIFICANT CHANGE UP (ref 27–34)
MCHC RBC-ENTMCNC: 32.9 GM/DL — SIGNIFICANT CHANGE UP (ref 32–36)
MCV RBC AUTO: 87.9 FL — SIGNIFICANT CHANGE UP (ref 80–100)
PLATELET # BLD AUTO: SIGNIFICANT CHANGE UP (ref 150–400)
POTASSIUM SERPL-MCNC: 4 MMOL/L — SIGNIFICANT CHANGE UP (ref 3.5–5.3)
POTASSIUM SERPL-SCNC: 4 MMOL/L — SIGNIFICANT CHANGE UP (ref 3.5–5.3)
PROT SERPL-MCNC: 6.5 GM/DL — SIGNIFICANT CHANGE UP (ref 6–8.3)
RBC # BLD: 3.56 M/UL — LOW (ref 3.8–5.2)
RBC # FLD: 15.1 % — HIGH (ref 10.3–14.5)
SODIUM SERPL-SCNC: 137 MMOL/L — SIGNIFICANT CHANGE UP (ref 135–145)
SPECIMEN SOURCE: SIGNIFICANT CHANGE UP
SPECIMEN SOURCE: SIGNIFICANT CHANGE UP
WBC # BLD: 3.16 K/UL — LOW (ref 3.8–10.5)
WBC # FLD AUTO: 3.16 K/UL — LOW (ref 3.8–10.5)

## 2023-08-28 PROCEDURE — 99233 SBSQ HOSP IP/OBS HIGH 50: CPT

## 2023-08-28 RX ADMIN — ATOVAQUONE 750 MILLIGRAM(S): 750 SUSPENSION ORAL at 09:23

## 2023-08-28 RX ADMIN — Medication 650 MILLIGRAM(S): at 06:01

## 2023-08-28 RX ADMIN — Medication 25 MILLIGRAM(S): at 06:01

## 2023-08-28 RX ADMIN — ATOVAQUONE 750 MILLIGRAM(S): 750 SUSPENSION ORAL at 20:52

## 2023-08-28 RX ADMIN — Medication 650 MILLIGRAM(S): at 20:52

## 2023-08-28 RX ADMIN — AZITHROMYCIN 255 MILLIGRAM(S): 500 TABLET, FILM COATED ORAL at 09:23

## 2023-08-28 RX ADMIN — Medication 650 MILLIGRAM(S): at 22:30

## 2023-08-28 RX ADMIN — ATORVASTATIN CALCIUM 40 MILLIGRAM(S): 80 TABLET, FILM COATED ORAL at 20:52

## 2023-08-28 RX ADMIN — Medication 25 MILLIGRAM(S): at 17:52

## 2023-08-28 RX ADMIN — Medication 81 MILLIGRAM(S): at 09:23

## 2023-08-28 NOTE — PROGRESS NOTE ADULT - ASSESSMENT
66 y/o female with PMHx of CAD (s/p stents) admitted in the hospital with high fevers as high as 105.  Associated symptoms of nausea, not feeling well and weakness. Patient also notes that   she was standing and lost consciousness, then fell backward onto the bed.  Denies head strike. Denies coughing, vomiting, diarrhea, rhinorrhea, sore throat, urinary symptoms, chest pain. Nonsmoker, nondrinker. Patient notes of being bitten by the deer tic in july.  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      #Babesiosis:    Cont IV zithromax/ mepron.    F/u other tick testing-- lyme negative.    F/u cultures.    Trend % babesia-- still 2%.    Trend temps-- still febrile.    Trend cbc-- WBC 3.1.  Hgb 10.3.  Plt 45-- still downtrending.    ID f/u.      #Syncope:    Ssupect vasovagal related to fever/ babesia.    Check ECHO.    Cardio eval.      #CAD s/p PCI:    Trop negative.    Cont asa/ statin.      #DVT Proph:  Hold with thrombocytopenia.      D/w planning once fevers resolve/ % affected decreases/ counts rebound.

## 2023-08-28 NOTE — PROGRESS NOTE ADULT - ASSESSMENT
66 y/o/f with PMHx of CAD (s/p stents) admitted in the hospital with high fevers as high as 105. Associated symptoms of nausea, not feeling well and weakness. Patient also notes that  she was standing and lost consciousness, then fell backward onto the bed.  Denies head strike. Denies coughing, vomiting, diarrhea, rhinorrhea, sore throat, urinary symptoms, chest pain. Nonsmoker, nondrinker. Patient notes of being bitten by the deer tick in july. Here noted with babesia 2% parasitemia. Started on mepron/azithromycin.     1. Fever. Babesiosis. Tick borne illness  - babesia noted 2 % parasitemia c/w mild-mod disease  - on mepron 750mg BID #2-3  - on azithromycin 500mg daily #3  - complete 10 day course  - lyme screen (-),f/u ehrlichia, anaplasma pcr r/o co infection  - monitor temps  - tolerating abx well so far; no side effects noted  - reason for abx use and side effects reviewed with patient  - supportive care  - fu cbc    2. other issues - care per medicine

## 2023-08-29 LAB
ANION GAP SERPL CALC-SCNC: 4 MMOL/L — LOW (ref 5–17)
BUN SERPL-MCNC: 13 MG/DL — SIGNIFICANT CHANGE UP (ref 7–23)
CALCIUM SERPL-MCNC: 9.2 MG/DL — SIGNIFICANT CHANGE UP (ref 8.5–10.1)
CHLORIDE SERPL-SCNC: 104 MMOL/L — SIGNIFICANT CHANGE UP (ref 96–108)
CLOSURE TME COLL+EPINEP BLD: 48 K/UL — LOW (ref 150–400)
CO2 SERPL-SCNC: 27 MMOL/L — SIGNIFICANT CHANGE UP (ref 22–31)
CREAT SERPL-MCNC: 0.65 MG/DL — SIGNIFICANT CHANGE UP (ref 0.5–1.3)
CULTURE RESULTS: SIGNIFICANT CHANGE UP
EGFR: 97 ML/MIN/1.73M2 — SIGNIFICANT CHANGE UP
GLUCOSE SERPL-MCNC: 125 MG/DL — HIGH (ref 70–99)
HCT VFR BLD CALC: 32 % — LOW (ref 34.5–45)
HGB BLD-MCNC: 10.9 G/DL — LOW (ref 11.5–15.5)
MCHC RBC-ENTMCNC: 30 PG — SIGNIFICANT CHANGE UP (ref 27–34)
MCHC RBC-ENTMCNC: 34.1 GM/DL — SIGNIFICANT CHANGE UP (ref 32–36)
MCV RBC AUTO: 88.2 FL — SIGNIFICANT CHANGE UP (ref 80–100)
PLATELET # BLD AUTO: SIGNIFICANT CHANGE UP (ref 150–400)
POTASSIUM SERPL-MCNC: 4.6 MMOL/L — SIGNIFICANT CHANGE UP (ref 3.5–5.3)
POTASSIUM SERPL-SCNC: 4.6 MMOL/L — SIGNIFICANT CHANGE UP (ref 3.5–5.3)
RBC # BLD: 3.63 M/UL — LOW (ref 3.8–5.2)
RBC # FLD: 15.3 % — HIGH (ref 10.3–14.5)
SODIUM SERPL-SCNC: 135 MMOL/L — SIGNIFICANT CHANGE UP (ref 135–145)
SPECIMEN SOURCE: SIGNIFICANT CHANGE UP
WBC # BLD: 3.1 K/UL — LOW (ref 3.8–10.5)
WBC # FLD AUTO: 3.1 K/UL — LOW (ref 3.8–10.5)

## 2023-08-29 PROCEDURE — 93306 TTE W/DOPPLER COMPLETE: CPT | Mod: 26

## 2023-08-29 PROCEDURE — 99233 SBSQ HOSP IP/OBS HIGH 50: CPT

## 2023-08-29 RX ADMIN — ATORVASTATIN CALCIUM 40 MILLIGRAM(S): 80 TABLET, FILM COATED ORAL at 22:07

## 2023-08-29 RX ADMIN — Medication 25 MILLIGRAM(S): at 05:53

## 2023-08-29 RX ADMIN — Medication 100 MILLIGRAM(S): at 13:43

## 2023-08-29 RX ADMIN — Medication 100 MILLIGRAM(S): at 22:08

## 2023-08-29 RX ADMIN — AZITHROMYCIN 255 MILLIGRAM(S): 500 TABLET, FILM COATED ORAL at 11:32

## 2023-08-29 RX ADMIN — ATOVAQUONE 750 MILLIGRAM(S): 750 SUSPENSION ORAL at 22:06

## 2023-08-29 RX ADMIN — ATOVAQUONE 750 MILLIGRAM(S): 750 SUSPENSION ORAL at 11:32

## 2023-08-29 RX ADMIN — Medication 81 MILLIGRAM(S): at 11:33

## 2023-08-29 NOTE — PROGRESS NOTE ADULT - ASSESSMENT
68 y/o female with PMHx of CAD (s/p stents) admitted in the hospital with high fevers as high as 105.  Associated symptoms of nausea, not feeling well and weakness. Patient also notes that   she was standing and lost consciousness, then fell backward onto the bed.  Denies head strike. Denies coughing, vomiting, diarrhea, rhinorrhea, sore throat, urinary symptoms, chest pain. Nonsmoker, nondrinker. Patient notes of being bitten by the deer tic in july.  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      #Babesiosis, c/b pancytopenia  -parasite load now <1%  -monitor CBC  -LFTs wnl  -lyme neg  -ehrlichia/anaplasma pending  -Azithro  Cont IV zithromax/ mepron.    F/u other tick testing-- lyme negative.    F/u cultures.    Trend % babesia-- still 2%.    Trend temps-- still febrile.    Trend cbc-- WBC 3.1.  Hgb 10.3.  Plt 45-- still downtrending.    ID f/u.      #Syncope:    Ssupect vasovagal related to fever/ babesia.    Check ECHO.    Cardio eval.      #CAD s/p PCI:    Trop negative.    Cont asa/ statin.      #DVT Proph:  Hold with thrombocytopenia.      D/w planning once fevers resolve/ % affected decreases/ counts rebound.         66 y/o female with PMHx of CAD (s/p stents) admitted in the hospital with high fevers as high as 105.  Associated symptoms of nausea, not feeling well and weakness. Patient also notes that   she was standing and lost consciousness, then fell backward onto the bed.  Denies head strike. Denies coughing, vomiting, diarrhea, rhinorrhea, sore throat, urinary symptoms, chest pain. Nonsmoker, nondrinker. Patient notes of being bitten by the deer tic in july.  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      #Babesiosis, c/b pancytopenia  -parasite load now <1%  -monitor CBC  -LFTs wnl  -lyme neg  -ehrlichia/anaplasma pending  -Azithro/Mepron  -Doxy added until anaplasma/ehrlichia results  -appreciate ID input    #Syncope  -ok to d/c tele, no events  -orthostatics neg  -echo unremarkable    Cont IV zithromax/ mepron.    F/u other tick testing-- lyme negative.    F/u cultures.    Trend % babesia-- still 2%.    Trend temps-- still febrile.    Trend cbc-- WBC 3.1.  Hgb 10.3.  Plt 45-- still downtrending.    ID f/u.      #Syncope:    Ssupect vasovagal related to fever/ babesia.    Check ECHO.    Cardio eval.      #CAD s/p PCI:    Trop negative.    Cont asa/ statin.      #DVT Proph:  Hold with thrombocytopenia.      D/w planning once fevers resolve/ % affected decreases/ counts rebound.         66 y/o female with PMHx of CAD (s/p stents) admitted in the hospital with high fevers as high as 105.  Associated symptoms of nausea, not feeling well and weakness. Patient also notes that   she was standing and lost consciousness, then fell backward onto the bed.  Denies head strike. Denies coughing, vomiting, diarrhea, rhinorrhea, sore throat, urinary symptoms, chest pain. Nonsmoker, nondrinker. Patient notes of being bitten by the deer tic in july.  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      #Babesiosis, c/b pancytopenia  -continued fevers, overall temp curve improving  -parasite load now <1%  -monitor CBC  -LFTs wnl  -lyme neg  -ehrlichia/anaplasma pending  -Azithro/Mepron  -Doxy added until anaplasma/ehrlichia results  -appreciate ID input    #Syncope  -ok to d/c tele, no events  -orthostatics neg  -echo unremarkable    #CAD s/p PCI:    -Trop negative.    -Cont asa/ statin.      #DVT ppx- SCDs    D/c pending improvement in temp curve and CBC, possibly 8/30     66 y/o female with PMHx of CAD (s/p stents) admitted in the hospital with high fevers as high as 105.  Associated symptoms of nausea, not feeling well and weakness. Patient also notes that   she was standing and lost consciousness, then fell backward onto the bed.  Denies head strike. Denies coughing, vomiting, diarrhea, rhinorrhea, sore throat, urinary symptoms, chest pain. Nonsmoker, nondrinker. Patient notes of being bitten by the deer tic in july.  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      #Sepsis 2/2 babesiosis, c/b pancytopenia  -continued fevers, overall temp curve improving  -parasite load now <1%  -monitor CBC  -LFTs wnl  -lyme neg  -ehrlichia/anaplasma pending  -Azithro/Mepron  -Doxy added until anaplasma/ehrlichia results  -appreciate ID input    #Syncope  -ok to d/c tele, no events  -orthostatics neg  -echo unremarkable    #CAD s/p PCI:    -Trop negative.    -Cont asa/ statin.      #DVT ppx- SCDs    D/c pending improvement in temp curve and CBC, possibly 8/30

## 2023-08-29 NOTE — PROGRESS NOTE ADULT - TIME BILLING
Time spent  coordinating the patient's care. This includes reviewing documentation pertinent to this admission, results and imaging. Further tests, medications, and procedures have been ordered as indicated. Laboratory results and the plan of care were communicated to the patient and friend. Discussed care plan with consultants including ID. Supporting documentation was completed and added to the patient's chart.

## 2023-08-29 NOTE — PROGRESS NOTE ADULT - TIME-BASED BILLING (NON-CRITICAL CARE)
Time-based billing (NON-critical care) Klisyri Pregnancy And Lactation Text: It is unknown if this medication can harm a developing fetus or if it is excreted in breast milk.

## 2023-08-30 LAB
A PHAGOCYTOPH DNA BLD QL NAA+PROBE: NEGATIVE — SIGNIFICANT CHANGE UP
A PHAGOCYTOPH DNA BLD QL NAA+PROBE: NEGATIVE — SIGNIFICANT CHANGE UP
B MICROTI DNA BLD QL NAA+PROBE: POSITIVE
B MIYAMOTOI GLPQ BLD QL NAA+NON-PROBE: NEGATIVE — SIGNIFICANT CHANGE UP
BABESIA DNA SPEC QL NAA+PROBE: NEGATIVE — SIGNIFICANT CHANGE UP
BABESIA DNA SPEC QL NAA+PROBE: NEGATIVE — SIGNIFICANT CHANGE UP
CLOSURE TME COLL+EPINEP BLD: 54 K/UL — LOW (ref 150–400)
E CHAFFEENSIS DNA BLD QL NAA+PROBE: NEGATIVE — SIGNIFICANT CHANGE UP
E CHAFFEENSIS DNA BLD QL NAA+PROBE: NEGATIVE — SIGNIFICANT CHANGE UP
E EWINGII DNA SPEC QL NAA+PROBE: NEGATIVE — SIGNIFICANT CHANGE UP
E EWINGII DNA SPEC QL NAA+PROBE: NEGATIVE — SIGNIFICANT CHANGE UP
EHRLICHIA DNA SPEC QL NAA+PROBE: NEGATIVE — SIGNIFICANT CHANGE UP
EHRLICHIA DNA SPEC QL NAA+PROBE: NEGATIVE — SIGNIFICANT CHANGE UP
HCT VFR BLD CALC: 30.4 % — LOW (ref 34.5–45)
HGB BLD-MCNC: 10.3 G/DL — LOW (ref 11.5–15.5)
MCHC RBC-ENTMCNC: 29.7 PG — SIGNIFICANT CHANGE UP (ref 27–34)
MCHC RBC-ENTMCNC: 33.9 GM/DL — SIGNIFICANT CHANGE UP (ref 32–36)
MCV RBC AUTO: 87.6 FL — SIGNIFICANT CHANGE UP (ref 80–100)
PLATELET # BLD AUTO: SIGNIFICANT CHANGE UP K/UL (ref 150–400)
RBC # BLD: 3.47 M/UL — LOW (ref 3.8–5.2)
RBC # FLD: 15.4 % — HIGH (ref 10.3–14.5)
WBC # BLD: 3.05 K/UL — LOW (ref 3.8–10.5)
WBC # FLD AUTO: 3.05 K/UL — LOW (ref 3.8–10.5)

## 2023-08-30 PROCEDURE — 99232 SBSQ HOSP IP/OBS MODERATE 35: CPT

## 2023-08-30 RX ADMIN — ATORVASTATIN CALCIUM 40 MILLIGRAM(S): 80 TABLET, FILM COATED ORAL at 22:03

## 2023-08-30 RX ADMIN — Medication 25 MILLIGRAM(S): at 17:02

## 2023-08-30 RX ADMIN — ATOVAQUONE 750 MILLIGRAM(S): 750 SUSPENSION ORAL at 09:50

## 2023-08-30 RX ADMIN — AZITHROMYCIN 255 MILLIGRAM(S): 500 TABLET, FILM COATED ORAL at 10:10

## 2023-08-30 RX ADMIN — Medication 81 MILLIGRAM(S): at 09:56

## 2023-08-30 RX ADMIN — Medication 25 MILLIGRAM(S): at 06:14

## 2023-08-30 RX ADMIN — ATOVAQUONE 750 MILLIGRAM(S): 750 SUSPENSION ORAL at 21:58

## 2023-08-30 NOTE — PROGRESS NOTE ADULT - ASSESSMENT
66 y/o female with PMHx of CAD (s/p stents) admitted in the hospital with high fevers as high as 105.  Associated symptoms of nausea, not feeling well and weakness. Patient also notes that   she was standing and lost consciousness, then fell backward onto the bed.  Denies head strike. Denies coughing, vomiting, diarrhea, rhinorrhea, sore throat, urinary symptoms, chest pain. Nonsmoker, nondrinker. Patient notes of being bitten by the deer tic in july.  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      #Sepsis 2/2 babesiosis, c/b pancytopenia  -now afebrile >24hrs  -parasite load now <1%  -monitor CBC, plt now uptrending  -LFTs wnl  -lyme neg  -ehrlichia/anaplasma neg  -Azithro/Mepron  -appreciate ID input    #Syncope  -ok to d/c tele, no events  -orthostatics neg x2  -echo unremarkable  -PT eval    #CAD s/p PCI:    -Trop negative.    -Cont asa/ statin.      #DVT ppx- SCDs    D/c likely 8/31 pending PT eval, sx control, continued improvement in CBC

## 2023-08-30 NOTE — PROGRESS NOTE ADULT - ASSESSMENT
68 y/o/f with PMHx of CAD (s/p stents) admitted in the hospital with high fevers as high as 105. Associated symptoms of nausea, not feeling well and weakness. Patient also notes that  she was standing and lost consciousness, then fell backward onto the bed.  Denies head strike. Denies coughing, vomiting, diarrhea, rhinorrhea, sore throat, urinary symptoms, chest pain. Nonsmoker, nondrinker. Patient notes of being bitten by the deer tick in july. Here noted with babesia 2% parasitemia. Started on mepron/azithromycin.     1. Fever. Babesiosis. Tick borne illness  - babesia noted 2 % parasitemia c/w mild-mod disease repeat level < 1% improving   - on mepron 750mg BID #4-5  - on azithromycin 500mg daily #5  - complete 10 day course  - lyme screen (-), hrlichia, anaplasma pcr (-) - dc doxycycline   - monitor temps  - tolerating abx well so far; no side effects noted  - reason for abx use and side effects reviewed with patient  - supportive care  - fu cbc    2. other issues - care per medicine

## 2023-08-31 ENCOUNTER — TRANSCRIPTION ENCOUNTER (OUTPATIENT)
Age: 68
End: 2023-08-31

## 2023-08-31 LAB
HCT VFR BLD CALC: 30.6 % — LOW (ref 34.5–45)
HGB BLD-MCNC: 10.1 G/DL — LOW (ref 11.5–15.5)
MCHC RBC-ENTMCNC: 29.4 PG — SIGNIFICANT CHANGE UP (ref 27–34)
MCHC RBC-ENTMCNC: 33 GM/DL — SIGNIFICANT CHANGE UP (ref 32–36)
MCV RBC AUTO: 89 FL — SIGNIFICANT CHANGE UP (ref 80–100)
PLATELET # BLD AUTO: 83 K/UL — LOW (ref 150–400)
RBC # BLD: 3.44 M/UL — LOW (ref 3.8–5.2)
RBC # FLD: 15.3 % — HIGH (ref 10.3–14.5)
WBC # BLD: 2.92 K/UL — LOW (ref 3.8–10.5)
WBC # FLD AUTO: 2.92 K/UL — LOW (ref 3.8–10.5)

## 2023-08-31 PROCEDURE — 99232 SBSQ HOSP IP/OBS MODERATE 35: CPT

## 2023-08-31 RX ORDER — AZITHROMYCIN 500 MG/1
500 TABLET, FILM COATED ORAL DAILY
Refills: 0 | Status: DISCONTINUED | OUTPATIENT
Start: 2023-09-01 | End: 2023-09-01

## 2023-08-31 RX ORDER — ATOVAQUONE 750 MG/5ML
5 SUSPENSION ORAL
Qty: 50 | Refills: 0
Start: 2023-08-31 | End: 2023-09-04

## 2023-08-31 RX ADMIN — Medication 25 MILLIGRAM(S): at 21:38

## 2023-08-31 RX ADMIN — ATOVAQUONE 750 MILLIGRAM(S): 750 SUSPENSION ORAL at 09:27

## 2023-08-31 RX ADMIN — ATOVAQUONE 750 MILLIGRAM(S): 750 SUSPENSION ORAL at 21:38

## 2023-08-31 RX ADMIN — ATORVASTATIN CALCIUM 40 MILLIGRAM(S): 80 TABLET, FILM COATED ORAL at 21:38

## 2023-08-31 RX ADMIN — Medication 81 MILLIGRAM(S): at 09:44

## 2023-08-31 RX ADMIN — AZITHROMYCIN 255 MILLIGRAM(S): 500 TABLET, FILM COATED ORAL at 09:38

## 2023-08-31 NOTE — DISCHARGE NOTE PROVIDER - HOSPITAL COURSE
66 y/o woman with CAD, hx of PCI with stent, presented to the ED with complaint of fevers, generalized weakness, syncope. female with PMHx of CAD (s/p stents) admitted in the hospital with high fevers as high as 105.  Associated symptoms of nausea, not feeling well and weakness. Patient reported being bitten by the tick in July. Here, she was found to have babesia 2% parasitemia. Admitted to Medicine.     Sepsis due to babesiosis  Appreciate input from Infectious Disease. Patient was started on azithromycin and atovaquone. Babesia parasitemia improved thereafter, level <1%. Lyme screen negative. Ehrlichia and anaplasma PCR negative. Symptomatically improved. Stable for discharge home on continued azithromycin and atovaquone to complete 10-day course.    Syncope  In setting of dehydration due to acute infection described above. Orthostatic vital signs now negative. No events on cardiac telemetry monitoring. Echocardiogram unremarkable. Patient seen by PT, ambulating steadily.     Pancytopenia  Due to babesiosis infection. Improving WBC, Hgb and Platelets at this time.     Hyponatremia  Due to dehydration due to infection. Na normalized with Iv fluid hydration    Hx of CAD, Hx of PCI  Trop negative.  Continue medical management with aspirin, statin.       Discharge Exam:  Afebrile  BP 110s/60s  HR 60s  RR 16-18  O2 93-96% on RA  Gen: Comfortable appearing  HEENT: NCAT PERRL EOMI MMM clear oropharynx  Neck: Supple, no carotid bruits, no palpable lymph nodes, no thyromegaly  CVS: +S1, +S2, regular, rate WNL  Chest: Normal resp effort, lungs CTA B/L  Abd: +BS, non distended, soft, nontender  Ext: No edema or calf tenderness, normal cap refill  Skin: Warm, dry   Neuro: Awake and alert, answers questions appropriately, follows commands, no gross focal deficits  Mood: Calm, pleasant    Labs:                        10.1   2.92  )--------( 83                   30.6     Na, K, Cl, CO2, BUN, Cr WNL, Gluc 125    Tprot WNL,  alb 2.4, AST 47, ALT 56, Alk Phos 64    Haptoglobin <20      Folate and B12 WNL   Iron 21 TIBC 207  iron Sat 10%  [id number]    UA negative    Acute Lyme negative, Anaplasma, negative, Ehrlichia negative, Borrelia negative, Babesia DETECTED, Babesia parasitemia 2% --> less than 1%     Imaging:  CT chest WO 8/26: Patent central airways.  Bibasilar atelectasis. No consolidation or airspace disease. No pleural effusion. No lymphadenopathy. Heart size is normal. No pericardial effusion.    CXR 8/26: Trace L base infiltrate vs atelectasis    Cardiac Testing:  TTE 8/29:  The mitral valve leaflets appear calcified. Trace mitral regurgitation is present. The aortic valve is well visualized, appears mildly calcified. Valve opening seems to be normal. Normal appearing tricuspid valve structure. Mild (1+) tricuspid valve regurgitation is present. Pulmonic valve not well seen, probably normal pulmonic valve function. Normal appearing left atrium. The left ventricle is normal in size, wall thickness, wall motion and contractility. Estimated left ventricular ejection fraction is 60-65 %. Normal appearing right atrium. Normal appearing right ventricle structure and function.    EKG 8/26: NSR, rate WNL                            CULTURES:  UCx <10K  Lyme neg  Ehrlichia and anaplasma neg    Additional results/Imaging, I have personally reviewed:  CXR 8/26/23: Minimal left base infiltrate.    CT chest noncon 8/26/23: No evidence of pneumonia.    Echo 8/29/23: The mitral valve leaflets appear calcified. Trace mitral regurgitation is present. The aortic valve is well visualized, appears mildly calcified. Valve opening seems to be normal. Normal appearing tricuspid valve structure. Mild (1+) tricuspid valve regurgitation is present. Pulmonic valve not well seen, probably normal pulmonic valve function. Normal appearing left atrium. The left ventricle is normal in size, wall thickness, wall motion and contractility. Estimated left ventricular ejection fraction is 60-65 %. Normal appearing right atrium. Normal appearing right ventricle structure and function.    Telemetry, personally reviewed:  8/29/23: sinus 60s, d/c tele   66 y/o woman with CAD, hx of PCI with stent, presented to the ED with complaint of fevers, generalized weakness, syncope. Patient reported being bitten by the tick in July. Here, she was found to have babesia 2% parasitemia. Admitted to Medicine.     Sepsis due to babesiosis  Appreciate input from Infectious Disease. Patient was started on azithromycin and atovaquone. Babesia parasitemia improved thereafter, level <1%. Lyme screen negative. Ehrlichia and anaplasma PCR negative. Symptomatically improved. Stable for discharge home on continued azithromycin and atovaquone to complete 10-day course.    Syncope  In setting of dehydration due to acute infection described above. Orthostatic vital signs now negative. No events on cardiac telemetry monitoring. Echocardiogram unremarkable. Patient seen by PT, ambulating steadily.     Pancytopenia  Due to babesiosis infection. Improving WBC, Hgb and Platelets at this time.     Hyponatremia  Due to dehydration due to infection. Na normalized with Iv fluid hydration    Hx of CAD, Hx of PCI  Trop negative.  Continue medical management with aspirin, statin.       Discharge Exam:  Afebrile  BP 110s/60s  HR 60s  RR 16-18  O2 93-96% on RA  Gen: Comfortable appearing  HEENT: NCAT PERRL EOMI MMM clear oropharynx  Neck: Supple, no carotid bruits, no palpable lymph nodes, no thyromegaly  CVS: +S1, +S2, regular, rate WNL  Chest: Normal resp effort, lungs CTA B/L  Abd: +BS, non distended, soft, nontender  Ext: No edema or calf tenderness, normal cap refill  Skin: Warm, dry   Neuro: Awake and alert, answers questions appropriately, follows commands, no gross focal deficits  Mood: Calm, pleasant    Labs:                        10.1   2.92  )--------( 83                   30.6     Na, K, Cl, CO2, BUN, Cr WNL, Gluc 125    Tprot WNL,  alb 2.4, AST 47, ALT 56, Alk Phos 64    Haptoglobin <20      Folate and B12 WNL   Iron 21 TIBC 207  iron Sat 10%  [id number]    UA negative    Acute Lyme negative, Anaplasma, negative, Ehrlichia negative, Borrelia negative, Babesia DETECTED, Babesia parasitemia 2% --> less than 1%     Imaging:  CT chest WO 8/26: Patent central airways.  Bibasilar atelectasis. No consolidation or airspace disease. No pleural effusion. No lymphadenopathy. Heart size is normal. No pericardial effusion.    CXR 8/26: Trace L base infiltrate vs atelectasis    Cardiac Testing:  TTE 8/29:  The mitral valve leaflets appear calcified. Trace mitral regurgitation is present. The aortic valve is well visualized, appears mildly calcified. Valve opening seems to be normal. Normal appearing tricuspid valve structure. Mild (1+) tricuspid valve regurgitation is present. Pulmonic valve not well seen, probably normal pulmonic valve function. Normal appearing left atrium. The left ventricle is normal in size, wall thickness, wall motion and contractility. Estimated left ventricular ejection fraction is 60-65 %. Normal appearing right atrium. Normal appearing right ventricle structure and function.    EKG 8/26: NSR, rate WNL                            CULTURES:  UCx <10K  Lyme neg  Ehrlichia and anaplasma neg    Additional results/Imaging, I have personally reviewed:  CXR 8/26/23: Minimal left base infiltrate.    CT chest noncon 8/26/23: No evidence of pneumonia.    Echo 8/29/23: The mitral valve leaflets appear calcified. Trace mitral regurgitation is present. The aortic valve is well visualized, appears mildly calcified. Valve opening seems to be normal. Normal appearing tricuspid valve structure. Mild (1+) tricuspid valve regurgitation is present. Pulmonic valve not well seen, probably normal pulmonic valve function. Normal appearing left atrium. The left ventricle is normal in size, wall thickness, wall motion and contractility. Estimated left ventricular ejection fraction is 60-65 %. Normal appearing right atrium. Normal appearing right ventricle structure and function.    Telemetry, personally reviewed:  8/29/23: sinus 60s, d/c tele

## 2023-08-31 NOTE — DISCHARGE NOTE PROVIDER - NSDCMRMEDTOKEN_GEN_ALL_CORE_FT
aspirin 81 mg oral delayed release tablet: 1 tab(s) orally once a day  atovaquone 750 mg/5 mL oral suspension: 5 milliliter(s) orally every 12 hours Begin this evening  azithromycin 500 mg oral tablet: 1 tab(s) orally once a day  Metoprolol Succinate ER 25 mg oral tablet, extended release: 1 tab(s) orally once a day  rosuvastatin 20 mg oral tablet: 1 tab(s) orally once a day

## 2023-08-31 NOTE — DISCHARGE NOTE PROVIDER - NSDCCPCAREPLAN_GEN_ALL_CORE_FT
PRINCIPAL DISCHARGE DIAGNOSIS  Diagnosis: Babesiosis  Assessment and Plan of Treatment: You were evaluated and treated in the hospital for fevers, generalized weakness, syncope. you were found to have babesia infection, a tick-borne illness, due to recent tick bite. You were seen by Infectious Disease, started on treatment with azithromycin and atovaquone, and you clinically improved thereafter. Babesia parasitemia improved. Lyme screen negative. Ehrlichia and anaplasma negative. Stable for discharge home on continued azithromycin and atovaquone to complete 10-day course. Follow up with Primary Care for routine post-discharge check-up.      SECONDARY DISCHARGE DIAGNOSES  Diagnosis: Syncope  Assessment and Plan of Treatment: In setting of dehydration due to acute infection described above. Orthostatic vital signs now negative. No events on cardiac telemetry monitoring. Echocardiogram unremarkable. Patient seen by PT, ambulating steadily.    Diagnosis: Hyponatremia  Assessment and Plan of Treatment: In setting of dehydration due to acute infection described above. Normalized with IV fluid hydration.

## 2023-08-31 NOTE — DISCHARGE NOTE PROVIDER - CARE PROVIDER_API CALL
Barby De Paz  Internal Medicine  [address] [place] [id number]  Phone: [phone]  Fax: [phone]  Follow Up Time: 2 weeks

## 2023-08-31 NOTE — DISCHARGE NOTE NURSING/CASE MANAGEMENT/SOCIAL WORK - PATIENT PORTAL LINK FT
You can access the FollowMyHealth Patient Portal offered by [facility] by registering at the following website: [email or web address]. By joining [facility]’s FollowMyHealth portal, you will also be able to view your health information using other applications (apps) compatible with our system.

## 2023-08-31 NOTE — DISCHARGE NOTE NURSING/CASE MANAGEMENT/SOCIAL WORK - NSDCPEFALRISK_GEN_ALL_CORE
For information on Fall & Injury Prevention, visit: [email or web address] OR  [email or web address] OR  https://www.cdc.gov/steadi/patient.html

## 2023-08-31 NOTE — PHYSICAL THERAPY INITIAL EVALUATION ADULT - NSPTDISCHREC_GEN_A_CORE
Pt was able to amb 150' independently w/o any AD, slow and steady on feet, no c/o, Pt was left sitting in chair at end of PT rx, alarm on, Pt does not require acute care PT, DC from PT, pt can amb with floor staff/No skilled PT needs

## 2023-08-31 NOTE — DISCHARGE NOTE PROVIDER - NSDCCAREPROVSEEN_GEN_ALL_CORE_FT
Irma Darden (Infectious Disease)  Alma Hollis (Medicine)  Palla, Venugopal R (Cardiology)  Adis Arroyo (Medicine)  Facundo Hernandez (Medicine)

## 2023-08-31 NOTE — PHYSICAL THERAPY INITIAL EVALUATION ADULT - PERTINENT HX OF CURRENT PROBLEM, REHAB EVAL
66 y/o female with PMHx of CAD (s/p stents) admitted in the hospital with high fevers as high as 105.  Associated symptoms of nausea, not feeling well and weakness. Patient also notes that   she was standing and lost consciousness, then fell backward onto the bed. Patient notes of being bitten by the deer tic in july.  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 66 y/o female with PMHx of CAD (s/p stents) admitted in the hospital with high fevers as high as 105.  Associated symptoms of nausea, not feeling well and weakness. Patient also notes that   she was standing and lost consciousness, then fell backward onto the bed. Patient notes of being bitten by the [place]ic in [place]. 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

## 2023-08-31 NOTE — PROGRESS NOTE ADULT - ASSESSMENT
68 y/o woman with CAD, hx of PCI with stent, presented to the ED with complaint of fevers, generalized weakness, syncope. female with PMHx of CAD (s/p stents) admitted in the hospital with high fevers as high as 105.  Associated symptoms of nausea, not feeling well and weakness. Patient reported being bitten by the tick in July. Here, she was found to have babesia 2% parasitemia. Admitted to Medicine.     Sepsis due to babesiosis, present upon admission  Appreciate input from Infectious Disease. Patient was started on azithromycin and atovaquone. Babesia parasitemia improved thereafter, level <1%. Lyme screen negative. Ehrlichia and anaplasma PCR negative. Symptomatically improved. Stable for discharge home on continued azithromycin and atovaquone to complete 10-day course.    Syncope  In setting of dehydration due to acute infection described above. Orthostatic vital signs now negative. No events on cardiac telemetry monitoring. Echocardiogram unremarkable. Patient seen by PT, ambulating steadily.     Pancytopenia  Due to babesiosis infection. Improving WBC, Hgb and Platelets at this time.     Hyponatremia  Due to dehydration due to infection. Na normalized with Iv fluid hydration    Hx of CAD, Hx of PCI  Trop negative.  Continue medical management with aspirin, statin.

## 2023-09-01 VITALS
HEART RATE: 96 BPM | SYSTOLIC BLOOD PRESSURE: 161 MMHG | OXYGEN SATURATION: 98 % | DIASTOLIC BLOOD PRESSURE: 100 MMHG | RESPIRATION RATE: 18 BRPM | TEMPERATURE: 98 F

## 2023-09-01 LAB
CULTURE RESULTS: SIGNIFICANT CHANGE UP
CULTURE RESULTS: SIGNIFICANT CHANGE UP
SPECIMEN SOURCE: SIGNIFICANT CHANGE UP
SPECIMEN SOURCE: SIGNIFICANT CHANGE UP

## 2023-09-01 PROCEDURE — 99239 HOSP IP/OBS DSCHRG MGMT >30: CPT

## 2023-09-01 RX ORDER — AZITHROMYCIN 500 MG/1
1 TABLET, FILM COATED ORAL
Qty: 5 | Refills: 0
Start: 2023-09-01

## 2023-09-01 RX ORDER — AZITHROMYCIN 500 MG/1
1 TABLET, FILM COATED ORAL
Qty: 5 | Refills: 0
Start: 2023-09-01 | End: 2023-09-05

## 2023-09-01 RX ADMIN — ATOVAQUONE 750 MILLIGRAM(S): 750 SUSPENSION ORAL at 09:43

## 2023-09-01 RX ADMIN — Medication 81 MILLIGRAM(S): at 09:33

## 2023-09-01 RX ADMIN — AZITHROMYCIN 500 MILLIGRAM(S): 500 TABLET, FILM COATED ORAL at 13:14

## 2023-09-01 RX ADMIN — Medication 25 MILLIGRAM(S): at 09:33

## 2023-09-01 NOTE — PROGRESS NOTE ADULT - PROVIDER SPECIALTY LIST ADULT
Hospitalist
Hospitalist
Infectious Disease
Hospitalist
Infectious Disease
Infectious Disease

## 2023-09-01 NOTE — PROGRESS NOTE ADULT - ASSESSMENT
68 y/o/f with PMHx of CAD (s/p stents) admitted in the hospital with high fevers as high as 105. Associated symptoms of nausea, not feeling well and weakness. Patient also notes that  she was standing and lost consciousness, then fell backward onto the bed.  Denies head strike. Denies coughing, vomiting, diarrhea, rhinorrhea, sore throat, urinary symptoms, chest pain. Nonsmoker, nondrinker. Patient notes of being bitten by the deer tick in july. Here noted with babesia 2% parasitemia. Started on mepron/azithromycin.     1. Fever resolved. Babesiosis improving. Tick borne illness.   - babesia noted 2 % parasitemia c/w mild-mod disease repeat level < 1% improving   - on mepron 750mg BID since 8/27  - on azithromycin 500mg daily since 8/27  - lyme screen (-), hrlichia, anaplasma pcr (-) - dc doxycycline   - monitor temps  -parasitemia is improving  -Hb is stable  - tolerating abx well so far; no side effects noted  - reason for abx use and side effects reviewed with patient again  -no need for IV azithromycin  -can continue on mepron and azithromycin PO for approx 10 more day  -f/u with PMD as outpatient to repeat CBC and parasite level in approx 5-7 days  - supportive care  - fu cbc    2. other issues - care per medicine     d/w Dr. Arroyo and medical team

## 2023-09-01 NOTE — PROGRESS NOTE ADULT - SUBJECTIVE AND OBJECTIVE BOX
66 y/o female with PMHx of CAD (s/p stents) admitted in the hospital with high fevers as high as 105.  Associated symptoms of nausea, not feeling well and weakness. Patient also notes that   she was standing and lost consciousness, then fell backward onto the bed.  Denies head strike. Denies coughing, vomiting, diarrhea, rhinorrhea, sore throat, urinary symptoms, chest pain. Nonsmoker, nondrinker. Patient notes of being bitten by the deer tic in july.  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      8/28:  Pt seen.  Feeling much better today.  Fever 100.5 this am.  More energy.  Still 2% babesia on smear.      Vital Signs Last 24 Hrs  T(C): 37.8 (28 Aug 2023 17:41), Max: 38.1 (28 Aug 2023 05:59)  T(F): 100.1 (28 Aug 2023 17:41), Max: 100.5 (28 Aug 2023 05:59)  HR: 74 (28 Aug 2023 17:41) (55 - 74)  BP: 133/52 (28 Aug 2023 17:41) (102/57 - 138/57)  BP(mean): 71 (28 Aug 2023 17:41) (66 - 77)  RR: 16 (28 Aug 2023 15:58) (16 - 18)  SpO2: 96% (28 Aug 2023 15:58) (93% - 97%)    Parameters below as of 28 Aug 2023 15:58  Patient On (Oxygen Delivery Method): room air    ROS:   All 10 systems reviewed and found to be negative with the exception of what has been described above.    GEN: lying in bed, NAD  HEENT:   NC/AT, pupils equal and reactive, EOMI  CV:  +S1, +S2, RRR  RESP:   lungs clear to auscultation bilaterally, no wheeze, rales, rhonchi   BREAST:  not examined  GI:  abdomen soft, non-tender, non-distended, normoactive BS  RECTAL:  not examined  :  not examined  MSK:   normal muscle tone  EXT:  no edema  NEURO:  AAOX3, no focal neurological deficits  SKIN:  no rashes    08-28    137  |  107  |  11  ----------------------------<  113<H>  4.0   |  25  |  0.62    Ca    8.8      28 Aug 2023 06:24    TPro  6.5  /  Alb  2.4<L>  /  TBili  0.9  /  DBili  x   /  AST  47<H>  /  ALT  56  /  AlkPhos  64  08-28                        10.3   3.16  )-----------( Clumped    ( 28 Aug 2023 06:24 )             31.3     PLT 45.    LIVER FUNCTIONS - ( 28 Aug 2023 06:24 )  Alb: 2.4 g/dL / Pro: 6.5 gm/dL / ALK PHOS: 64 U/L / ALT: 56 U/L / AST: 47 U/L / GGT: x           PT/INR - ( 26 Aug 2023 18:46 )   PT: 13.4 sec;   INR: 1.19 ratio    PTT - ( 26 Aug 2023 18:46 )  PTT:31.1 sec    Urinalysis Basic - ( 28 Aug 2023 06:24 )  Color: x / Appearance: x / SG: x / pH: x  Gluc: 113 mg/dL / Ketone: x  / Bili: x / Urobili: x   Blood: x / Protein: x / Nitrite: x   Leuk Esterase: x / RBC: x / WBC x   Sq Epi: x / Non Sq Epi: x / Bacteria: x      MEDICATIONS  (STANDING):  aspirin  chewable 81 milliGRAM(s) Oral daily  atorvastatin 40 milliGRAM(s) Oral at bedtime  atovaquone  Suspension 750 milliGRAM(s) Oral every 12 hours  azithromycin  IVPB 500 milliGRAM(s) IV Intermittent every 24 hours  metoprolol tartrate 25 milliGRAM(s) Oral every 12 hours    MEDICATIONS  (PRN):  acetaminophen     Tablet .. 650 milliGRAM(s) Oral every 6 hours PRN Mild Pain (1 - 3), Moderate Pain (4 - 6)  
Date of service: 08-30-23 @ 12:28    pt seen and examined   fevers are down  feels weak but better overall  no new complaints     ROS: denies dizziness, no HA, no SOB or cough, no abdominal pain, no diarrhea or constipation; no dysuria, no urinary frequency, no legs pain, no rashes      MEDICATIONS  (STANDING):  aspirin  chewable 81 milliGRAM(s) Oral daily  atorvastatin 40 milliGRAM(s) Oral at bedtime  atovaquone  Suspension 750 milliGRAM(s) Oral every 12 hours  azithromycin  IVPB 500 milliGRAM(s) IV Intermittent every 24 hours  metoprolol tartrate 25 milliGRAM(s) Oral every 12 hours    Vital Signs Last 24 Hrs  T(C): 36.7 (30 Aug 2023 08:00), Max: 37.3 (29 Aug 2023 18:21)  T(F): 98 (30 Aug 2023 08:00), Max: 99.2 (29 Aug 2023 18:21)  HR: 60 (30 Aug 2023 08:00) (60 - 70)  BP: 104/56 (30 Aug 2023 08:00) (99/50 - 117/49)  BP(mean): --  RR: 18 (30 Aug 2023 08:00) (18 - 20)  SpO2: 96% (30 Aug 2023 08:00) (96% - 98%)    Parameters below as of 30 Aug 2023 08:00  Patient On (Oxygen Delivery Method): room air            PE:  Constitutional: NAD  HEENT: NC/AT, EOMI, PERRLA, conjunctivae clear; ears and nose atraumatic; pharynx benign  Neck: supple; thyroid not palpable  Back: no tenderness  Respiratory: respiratory effort normal; clear to auscultation  Cardiovascular: S1S2 regular, no murmurs  Abdomen: soft, not tender, not distended, positive BS; liver and spleen WNL  Genitourinary: no suprapubic tenderness  Lymphatic: no LN palpable  Musculoskeletal: no muscle tenderness, no joint swelling or tenderness  Extremities: no pedal edema  Neurological/ Psychiatric: AxOx3, Judgement and insight normal;  moving all extremities  Skin: no rashes; no palpable lesions    Labs: all available labs reviewed                                   10.3   3.05  )-----------( CLUMPED    ( 30 Aug 2023 06:19 )             30.4     08-29    135  |  104  |  13  ----------------------------<  125<H>  4.6   |  27  |  0.65    Ca    9.2      29 Aug 2023 06:31          Culture Results:   Positive for Babesia species  by [facility] Stain  Parasitemia = 2.0 %  ************************************************************  NEGATIVE for Plasmodium antigens. Microscopy is performed for  confirmation.  The Malaria Rapid antigen test does not detect the  presence of Babesia species. If Babesiosis is suspected  please order test Babesia PCR: Babesia microti PCR Bld  ************************************************************ (08-26 @ 19:15)    Radiology: all available radiological tests reviewed  < from: CT Chest No Cont (08.26.23 @ 23:42) >  ACC: [id number] EXAM:  CT CHEST   ORDERED BY: SUMAN VAZQUEZ     PROCEDURE DATE:  08/26/2023          INTERPRETATION:  CLINICAL INFORMATION: Evaluate for pneumonia    COMPARISON: Same day radiograph    CONTRAST/COMPLICATIONS:  IV Contrast: NONE  Oral Contrast: NONE  Complications: None reported at time of study completion    PROCEDURE:  CT of the Chest was performed.  Sagittal and coronal reformats were performed.    FINDINGS:    LUNGS AND AIRWAYS: Patent central airways.  Bibasilar atelectasis.No   consolidation or airspace disease.  PLEURA: No pleural effusion.  MEDIASTINUM AND LAWSON: No lymphadenopathy.  VESSELS: Within normal limits.  HEART: Heart size is normal. No pericardial effusion.  CHEST WALL AND LOWER NECK: Within normal limits.  VISUALIZED UPPER ABDOMEN: Within normal limits.  BONES: Within normal limits.    IMPRESSION:  No evidence of pneumonia.      < end of copied text >    Advanced directives addressed: full resuscitation
HOSPITALIST ATTENDING PROGRESS NOTE    Chart and meds reviewed.  Patient seen and examined.    CC: fevers    Subjective: Denies CP, reports feeling fatigued. Updated friend on phone.     All other systems reviewed and found to be negative with the exception of what has been described above.    MEDICATIONS  (STANDING):  aspirin  chewable 81 milliGRAM(s) Oral daily  atorvastatin 40 milliGRAM(s) Oral at bedtime  atovaquone  Suspension 750 milliGRAM(s) Oral every 12 hours  azithromycin  IVPB 500 milliGRAM(s) IV Intermittent every 24 hours  doxycycline monohydrate Capsule 100 milliGRAM(s) Oral every 12 hours  metoprolol tartrate 25 milliGRAM(s) Oral every 12 hours    MEDICATIONS  (PRN):  acetaminophen     Tablet .. 650 milliGRAM(s) Oral every 6 hours PRN Mild Pain (1 - 3), Moderate Pain (4 - 6)      VITALS:  T(F): 99.5 (08-29-23 @ 07:55), Max: 101.2 (08-28-23 @ 20:46)  HR: 64 (08-29-23 @ 07:55) (64 - 74)  BP: 138/51 (08-29-23 @ 07:55) (130/58 - 142/57)  RR: 18 (08-29-23 @ 07:55) (16 - 18)  SpO2: 96% (08-29-23 @ 07:55) (95% - 96%)  Wt(kg): --    I&O's Summary      CAPILLARY BLOOD GLUCOSE          PHYSICAL EXAM:  Gen: NAD  HEENT:  pupils equal and reactive, EOMI, no oropharyngeal lesions, erythema, exudates, oral thrush  NECK:   supple, no carotid bruits, no palpable lymph nodes, no thyromegaly  CV:  +S1, +S2, regular, no murmurs or rubs  RESP:   lungs clear to auscultation bilaterally, no wheezing, rales, rhonchi, good air entry bilaterally  BREAST:  not examined  GI:  abdomen soft, non-tender, non-distended, normal BS, no bruits, no abdominal masses, no palpable masses  RECTAL:  not examined  :  not examined  MSK:   normal muscle tone, no atrophy, no rigidity, no contractions  EXT:  no clubbing, no cyanosis, no edema, no calf pain, swelling or erythema  VASCULAR:  pulses equal and symmetric in the upper and lower extremities  NEURO:  AAOX3, no focal neurological deficits, follows all commands, able to move extremities spontaneously  SKIN:  no ulcers, lesions or rashes    LABS:                            10.9   3.10  )-----------( Clumped    ( 29 Aug 2023 06:31 )             32.0     08-29    135  |  104  |  13  ----------------------------<  125<H>  4.6   |  27  |  0.65    Ca    9.2      29 Aug 2023 06:31    TPro  6.5  /  Alb  2.4<L>  /  TBili  0.9  /  DBili  x   /  AST  47<H>  /  ALT  56  /  AlkPhos  64  08-28        LIVER FUNCTIONS - ( 28 Aug 2023 06:24 )  Alb: 2.4 g/dL / Pro: 6.5 gm/dL / ALK PHOS: 64 U/L / ALT: 56 U/L / AST: 47 U/L / GGT: x             Urinalysis Basic - ( 29 Aug 2023 06:31 )    Color: x / Appearance: x / SG: x / pH: x  Gluc: 125 mg/dL / Ketone: x  / Bili: x / Urobili: x   Blood: x / Protein: x / Nitrite: x   Leuk Esterase: x / RBC: x / WBC x   Sq Epi: x / Non Sq Epi: x / Bacteria: x  CULTURES:  Culture Results:   Positive for Babesia species  by [facility] Stain  Parasitemia = <1 % (08-29 @ 06:31)    BCx NG  UCx <10K  Lyme neg  Ehrlichia and anaplasma pending    Additional results/Imaging, I have personally reviewed:  CXR 8/26/23: Minimal left base infiltrate.    CT chest noncon 8/26/23: No evidence of pneumonia.    Echo 8/29/23: The mitral valve leaflets appear calcified. Trace mitral regurgitation is present. The aortic valve is well visualized, appears mildly calcified. Valve opening seems to be normal. Normal appearing tricuspid valve structure. Mild (1+) tricuspid valve regurgitation is present. Pulmonic valve not well seen, probably normal pulmonic valve function. Normal appearing left atrium. The left ventricle is normal in size, wall thickness, wall motion and contractility. Estimated left ventricular ejection fraction is 60-65 %. Normal appearing right atrium. Normal appearing right ventricle structure and function.    Telemetry, personally reviewed:  8/29/23: sinus 60s
Medicine Attending:    Patient seen.  D/w friend and son.    Admitted by hospitalist overnight.    Chart reviewed.    Patient admitted with fevers to 104 at home and found to have anemia/ thrombocytopenia.    Found to have babesiosis/ parasites in RBC.    Babesia 2% on admission.  F/u repeat labs today.    Cont azithromycin/ mepron for treatment 10 days.    Patient continues with weakness/ fevers.    Will f/u in am.  
CC: Fevers    Subjective: Overall improved. No acute complaints today. Still a bit of generalized weakness by the end of the day. No other acute complaints.    All other systems reviewed and found to be negative with the exception of what has been described above.    MEDICATIONS  (STANDING):  aspirin  chewable 81 milliGRAM(s) Oral daily  atorvastatin 40 milliGRAM(s) Oral at bedtime  atovaquone  Suspension 750 milliGRAM(s) Oral every 12 hours  azithromycin  IVPB 500 milliGRAM(s) IV Intermittent every 24 hours  metoprolol tartrate 25 milliGRAM(s) Oral every 12 hours    MEDICATIONS  (PRN):  acetaminophen     Tablet .. 650 milliGRAM(s) Oral every 6 hours PRN Mild Pain (1 - 3), Moderate Pain (4 - 6)      Discharge Exam:  Afebrile  BP 110s/60s  HR 60s  RR 16-18  O2 93-96% on RA  Gen: Comfortable appearing  HEENT: NCAT PERRL EOMI MMM clear oropharynx  Neck: Supple, no carotid bruits, no palpable lymph nodes, no thyromegaly  CVS: +S1, +S2, regular, rate WNL  Chest: Normal resp effort, lungs CTA B/L  Abd: +BS, non distended, soft, nontender  Ext: No edema or calf tenderness, normal cap refill  Skin: Warm, dry   Neuro: Awake and alert, answers questions appropriately, follows commands, no gross focal deficits  Mood: Calm, pleasant    Labs:                        10.1   2.92  )--------( 83                   30.6     Na, K, Cl, CO2, BUN, Cr WNL, Gluc 125    Tprot WNL,  alb 2.4, AST 47, ALT 56, Alk Phos 64    Haptoglobin <20      Folate and B12 WNL   Iron 21 TIBC 207  iron Sat 10%  [id number]    UA negative    Acute Lyme negative, Anaplasma, negative, Ehrlichia negative, Borrelia negative, Babesia DETECTED, Babesia parasitemia 2% --> less than 1%     Imaging:  CT chest WO 8/26: Patent central airways.  Bibasilar atelectasis. No consolidation or airspace disease. No pleural effusion. No lymphadenopathy. Heart size is normal. No pericardial effusion.    CXR 8/26: Trace L base infiltrate vs atelectasis    Cardiac Testing:  TTE 8/29:  The mitral valve leaflets appear calcified. Trace mitral regurgitation is present. The aortic valve is well visualized, appears mildly calcified. Valve opening seems to be normal. Normal appearing tricuspid valve structure. Mild (1+) tricuspid valve regurgitation is present. Pulmonic valve not well seen, probably normal pulmonic valve function. Normal appearing left atrium. The left ventricle is normal in size, wall thickness, wall motion and contractility. Estimated left ventricular ejection fraction is 60-65 %. Normal appearing right atrium. Normal appearing right ventricle structure and function.    EKG 8/26: NSR, rate WNL    
Date of service: 08-28-23 @ 14:29    pt seen and examined   febrile yesterday and o/n  feels better this am  temps are down     ROS: denies dizziness, no HA, no SOB or cough, no abdominal pain, no diarrhea or constipation; no dysuria, no urinary frequency, no legs pain, no rashes    MEDICATIONS  (STANDING):  aspirin  chewable 81 milliGRAM(s) Oral daily  atorvastatin 40 milliGRAM(s) Oral at bedtime  atovaquone  Suspension 750 milliGRAM(s) Oral every 12 hours  azithromycin  IVPB 500 milliGRAM(s) IV Intermittent every 24 hours  metoprolol tartrate 25 milliGRAM(s) Oral every 12 hours    Vital Signs Last 24 Hrs  T(C): 36.8 (28 Aug 2023 08:00), Max: 38.6 (27 Aug 2023 14:42)  T(F): 98.3 (28 Aug 2023 08:00), Max: 101.5 (27 Aug 2023 14:42)  HR: 55 (28 Aug 2023 08:00) (55 - 73)  BP: 102/57 (28 Aug 2023 08:00) (102/57 - 122/52)  BP(mean): 68 (28 Aug 2023 05:59) (63 - 68)  RR: 18 (28 Aug 2023 08:00) (18 - 18)  SpO2: 95% (28 Aug 2023 08:00) (93% - 97%)    Parameters below as of 28 Aug 2023 08:00  Patient On (Oxygen Delivery Method): room air      PE:  Constitutional: NAD  HEENT: NC/AT, EOMI, PERRLA, conjunctivae clear; ears and nose atraumatic; pharynx benign  Neck: supple; thyroid not palpable  Back: no tenderness  Respiratory: respiratory effort normal; clear to auscultation  Cardiovascular: S1S2 regular, no murmurs  Abdomen: soft, not tender, not distended, positive BS; liver and spleen WNL  Genitourinary: no suprapubic tenderness  Lymphatic: no LN palpable  Musculoskeletal: no muscle tenderness, no joint swelling or tenderness  Extremities: no pedal edema  Neurological/ Psychiatric: AxOx3, Judgement and insight normal;  moving all extremities  Skin: no rashes; no palpable lesions    Labs: all available labs reviewed                                   10.3   3.16  )-----------( Clumped    ( 28 Aug 2023 06:24 )             31.3     08-28    137  |  107  |  11  ----------------------------<  113<H>  4.0   |  25  |  0.62    Ca    8.8      28 Aug 2023 06:24    TPro  6.5  /  Alb  2.4<L>  /  TBili  0.9  /  DBili  x   /  AST  47<H>  /  ALT  56  /  AlkPhos  64  08-28  Urinalysis Basic - ( 27 Aug 2023 05:49 )    Color: x / Appearance: x / SG: x / pH: x  Gluc: 127 mg/dL / Ketone: x  / Bili: x / Urobili: x   Blood: x / Protein: x / Nitrite: x   Leuk Esterase: x / RBC: x / WBC x   Sq Epi: x / Non Sq Epi: x / Bacteria: x        Culture Results:   Positive for Babesia species  by [facility] Stain  Parasitemia = 2.0 %  ************************************************************  NEGATIVE for Plasmodium antigens. Microscopy is performed for  confirmation.  The Malaria Rapid antigen test does not detect the  presence of Babesia species. If Babesiosis is suspected  please order test Babesia PCR: Babesia microti PCR Bld  ************************************************************ (08-26 @ 19:15)    Radiology: all available radiological tests reviewed  < from: CT Chest No Cont (08.26.23 @ 23:42) >  ACC: [id number] EXAM:  CT CHEST   ORDERED BY: SUMAN VAZQUEZ     PROCEDURE DATE:  08/26/2023          INTERPRETATION:  CLINICAL INFORMATION: Evaluate for pneumonia    COMPARISON: Same day radiograph    CONTRAST/COMPLICATIONS:  IV Contrast: NONE  Oral Contrast: NONE  Complications: None reported at time of study completion    PROCEDURE:  CT of the Chest was performed.  Sagittal and coronal reformats were performed.    FINDINGS:    LUNGS AND AIRWAYS: Patent central airways.  Bibasilar atelectasis.No   consolidation or airspace disease.  PLEURA: No pleural effusion.  MEDIASTINUM AND LAWSON: No lymphadenopathy.  VESSELS: Within normal limits.  HEART: Heart size is normal. No pericardial effusion.  CHEST WALL AND LOWER NECK: Within normal limits.  VISUALIZED UPPER ABDOMEN: Within normal limits.  BONES: Within normal limits.    IMPRESSION:  No evidence of pneumonia.      < end of copied text >    Advanced directives addressed: full resuscitation
Date of service: 09-01-23 @ 12:44    Asked to reevaluate abx therapy   The patient is insisting to continue IV abx therapy  No fever or chills  Ambulatory     ROS: no fever or chills; denies dizziness, no HA, no SOB or cough, no abdominal pain, no diarrhea or constipation; no dysuria, no legs pain, no rashes    MEDICATIONS  (STANDING):  aspirin  chewable 81 milliGRAM(s) Oral daily  atorvastatin 40 milliGRAM(s) Oral at bedtime  atovaquone  Suspension 750 milliGRAM(s) Oral every 12 hours  azithromycin   Tablet 500 milliGRAM(s) Oral daily  metoprolol tartrate 25 milliGRAM(s) Oral every 12 hours    Vital Signs Last 24 Hrs  T(C): 36.8 (01 Sep 2023 07:36), Max: 36.8 (31 Aug 2023 21:01)  T(F): 98.3 (01 Sep 2023 07:36), Max: 98.3 (31 Aug 2023 21:01)  HR: 96 (01 Sep 2023 07:36) (70 - 96)  BP: 161/100 (01 Sep 2023 07:36) (126/59 - 161/100)  BP(mean): --  RR: 18 (01 Sep 2023 07:36) (18 - 18)  SpO2: 98% (01 Sep 2023 07:36) (95% - 98%)    Parameters below as of 01 Sep 2023 07:36  Patient On (Oxygen Delivery Method): room air     Physical exam:    Constitutional: NAD  HEENT: NC/AT, EOMI, PERRLA, conjunctivae clear; ears and nose atraumatic  Neck: supple; thyroid not palpable  Back: no tenderness  Respiratory: respiratory effort normal; clear to auscultation  Cardiovascular: S1S2 regular, no murmurs  Abdomen: soft, not tender, not distended, positive BS  Genitourinary: no suprapubic tenderness  Lymphatic: no LN palpable  Musculoskeletal: no muscle tenderness, no joint swelling or tenderness  Extremities: no pedal edema  Neurological/ Psychiatric: AxOx3, moving all extremities  Skin: no rashes; no palpable lesions    Labs: reviewed                        10.1   2.92  )-----------( 83       ( 31 Aug 2023 06:51 )             30.6     Ferritin: 721 ng/mL (08-27-23 @ 12:31)                                 10.3   3.05  )-----------( CLUMPED    ( 30 Aug 2023 06:19 )             30.4     08-29    135  |  104  |  13  ----------------------------<  125<H>  4.6   |  27  |  0.65    Ca    9.2      29 Aug 2023 06:31          Culture Results:   Positive for Babesia species  by [facility] Stain  Parasitemia = 2.0 %  ************************************************************  NEGATIVE for Plasmodium antigens. Microscopy is performed for  confirmation.  The Malaria Rapid antigen test does not detect the  presence of Babesia species. If Babesiosis is suspected  please order test Babesia PCR: Babesia microti PCR Bld  ************************************************************ (08-26 @ 19:15)    Radiology: all available radiological tests reviewed  < from: CT Chest No Cont (08.26.23 @ 23:42) >  ACC: [id number] EXAM:  CT CHEST   ORDERED BY: SUMAN VAZQUEZ     PROCEDURE DATE:  08/26/2023          INTERPRETATION:  CLINICAL INFORMATION: Evaluate for pneumonia    COMPARISON: Same day radiograph    CONTRAST/COMPLICATIONS:  IV Contrast: NONE  Oral Contrast: NONE  Complications: None reported at time of study completion    PROCEDURE:  CT of the Chest was performed.  Sagittal and coronal reformats were performed.    FINDINGS:    LUNGS AND AIRWAYS: Patent central airways.  Bibasilar atelectasis.No   consolidation or airspace disease.  PLEURA: No pleural effusion.  MEDIASTINUM AND LAWSON: No lymphadenopathy.  VESSELS: Within normal limits.  HEART: Heart size is normal. No pericardial effusion.  CHEST WALL AND LOWER NECK: Within normal limits.  VISUALIZED UPPER ABDOMEN: Within normal limits.  BONES: Within normal limits.    IMPRESSION:  No evidence of pneumonia.      < end of copied text >    Advanced directives addressed: full resuscitation
HOSPITALIST ATTENDING PROGRESS NOTE    Chart and meds reviewed.  Patient seen and examined.    CC: fevers    Subjective: Feeling dizzy, weak.    All other systems reviewed and found to be negative with the exception of what has been described above.    MEDICATIONS  (STANDING):  aspirin  chewable 81 milliGRAM(s) Oral daily  atorvastatin 40 milliGRAM(s) Oral at bedtime  atovaquone  Suspension 750 milliGRAM(s) Oral every 12 hours  azithromycin  IVPB 500 milliGRAM(s) IV Intermittent every 24 hours  metoprolol tartrate 25 milliGRAM(s) Oral every 12 hours    MEDICATIONS  (PRN):  acetaminophen     Tablet .. 650 milliGRAM(s) Oral every 6 hours PRN Mild Pain (1 - 3), Moderate Pain (4 - 6)      VITALS:  T(F): 98.2 (08-30-23 @ 21:03), Max: 98.6 (08-30-23 @ 16:34)  HR: 65 (08-30-23 @ 21:03) (60 - 65)  BP: 116/47 (08-30-23 @ 21:03) (104/55 - 116/47)  RR: 18 (08-30-23 @ 21:03) (18 - 18)  SpO2: 96% (08-30-23 @ 21:03) (96% - 96%)  Wt(kg): --    I&O's Summary      CAPILLARY BLOOD GLUCOSE          PHYSICAL EXAM:  Gen: NAD  HEENT:  pupils equal and reactive, EOMI, no oropharyngeal lesions, erythema, exudates, oral thrush  NECK:   supple, no carotid bruits, no palpable lymph nodes, no thyromegaly  CV:  +S1, +S2, regular, no murmurs or rubs  RESP:   lungs clear to auscultation bilaterally, no wheezing, rales, rhonchi, good air entry bilaterally  BREAST:  not examined  GI:  abdomen soft, non-tender, non-distended, normal BS, no bruits, no abdominal masses, no palpable masses  RECTAL:  not examined  :  not examined  MSK:   normal muscle tone, no atrophy, no rigidity, no contractions  EXT:  no clubbing, no cyanosis, no edema, no calf pain, swelling or erythema  VASCULAR:  pulses equal and symmetric in the upper and lower extremities  NEURO:  AAOX3, no focal neurological deficits, follows all commands, able to move extremities spontaneously  SKIN:  no ulcers, lesions or rashes    LABS:                            10.3   3.05  )-----------( CLUMPED    ( 30 Aug 2023 06:19 )             30.4     08-29    135  |  104  |  13  ----------------------------<  125<H>  4.6   |  27  |  0.65    Ca    9.2      29 Aug 2023 06:31    Urinalysis Basic - ( 29 Aug 2023 06:31 )    Color: x / Appearance: x / SG: x / pH: x  Gluc: 125 mg/dL / Ketone: x  / Bili: x / Urobili: x   Blood: x / Protein: x / Nitrite: x   Leuk Esterase: x / RBC: x / WBC x   Sq Epi: x / Non Sq Epi: x / Bacteria: x    CULTURES:  UCx <10K  Lyme neg  Ehrlichia and anaplasma neg    Additional results/Imaging, I have personally reviewed:  CXR 8/26/23: Minimal left base infiltrate.    CT chest noncon 8/26/23: No evidence of pneumonia.    Echo 8/29/23: The mitral valve leaflets appear calcified. Trace mitral regurgitation is present. The aortic valve is well visualized, appears mildly calcified. Valve opening seems to be normal. Normal appearing tricuspid valve structure. Mild (1+) tricuspid valve regurgitation is present. Pulmonic valve not well seen, probably normal pulmonic valve function. Normal appearing left atrium. The left ventricle is normal in size, wall thickness, wall motion and contractility. Estimated left ventricular ejection fraction is 60-65 %. Normal appearing right atrium. Normal appearing right ventricle structure and function.    Telemetry, personally reviewed:  8/29/23: sinus 60s, d/c tele

## 2023-09-08 PROBLEM — Z95.5 PRESENCE OF CORONARY ANGIOPLASTY IMPLANT AND GRAFT: Chronic | Status: ACTIVE | Noted: 2023-08-26

## 2023-09-13 DIAGNOSIS — E87.1 HYPO-OSMOLALITY AND HYPONATREMIA: ICD-10-CM

## 2023-09-13 DIAGNOSIS — E86.0 DEHYDRATION: ICD-10-CM

## 2023-09-13 DIAGNOSIS — I25.10 ATHEROSCLEROTIC HEART DISEASE OF NATIVE CORONARY ARTERY WITHOUT ANGINA PECTORIS: ICD-10-CM

## 2023-09-13 DIAGNOSIS — Z95.5 PRESENCE OF CORONARY ANGIOPLASTY IMPLANT AND GRAFT: ICD-10-CM

## 2023-09-13 DIAGNOSIS — D61.818 OTHER PANCYTOPENIA: ICD-10-CM

## 2023-09-13 DIAGNOSIS — A41.89 OTHER SPECIFIED SEPSIS: ICD-10-CM

## 2023-09-13 DIAGNOSIS — I08.1 RHEUMATIC DISORDERS OF BOTH MITRAL AND TRICUSPID VALVES: ICD-10-CM

## 2023-09-13 DIAGNOSIS — B60.01 BABESIOSIS DUE TO BABESIA MICROTI: ICD-10-CM

## 2023-09-22 ENCOUNTER — APPOINTMENT (OUTPATIENT)
Dept: INTERNAL MEDICINE | Facility: CLINIC | Age: 68
End: 2023-09-22